# Patient Record
Sex: FEMALE | Race: WHITE | NOT HISPANIC OR LATINO | Employment: FULL TIME | URBAN - NONMETROPOLITAN AREA
[De-identification: names, ages, dates, MRNs, and addresses within clinical notes are randomized per-mention and may not be internally consistent; named-entity substitution may affect disease eponyms.]

---

## 2017-02-14 ENCOUNTER — APPOINTMENT (OUTPATIENT)
Dept: GENERAL RADIOLOGY | Facility: HOSPITAL | Age: 32
End: 2017-02-14

## 2017-02-14 ENCOUNTER — HOSPITAL ENCOUNTER (EMERGENCY)
Facility: HOSPITAL | Age: 32
Discharge: HOME | End: 2017-02-14
Admitting: FAMILY MEDICINE

## 2017-02-14 VITALS
HEART RATE: 95 BPM | SYSTOLIC BLOOD PRESSURE: 144 MMHG | TEMPERATURE: 97.9 F | OXYGEN SATURATION: 100 % | RESPIRATION RATE: 18 BRPM | BODY MASS INDEX: 31.89 KG/M2 | DIASTOLIC BLOOD PRESSURE: 104 MMHG | HEIGHT: 63 IN | WEIGHT: 180 LBS

## 2017-02-14 DIAGNOSIS — S60.212A CONTUSION OF LEFT WRIST, INITIAL ENCOUNTER: Primary | ICD-10-CM

## 2017-02-14 PROCEDURE — 73130 X-RAY EXAM OF HAND: CPT

## 2017-02-14 PROCEDURE — 73110 X-RAY EXAM OF WRIST: CPT

## 2017-02-14 PROCEDURE — 99283 EMERGENCY DEPT VISIT LOW MDM: CPT

## 2017-02-14 RX ORDER — IBUPROFEN 800 MG/1
800 TABLET ORAL
Qty: 20 TABLET | Refills: 0 | Status: SHIPPED | OUTPATIENT
Start: 2017-02-14 | End: 2019-02-26

## 2017-02-15 NOTE — ED NOTES
Patient discharged home  with family at side ambulatory to personal car. No distress noted. Personal belongings with patient. Patient voiced understanding to instructions given. Vascular checks intact.       Nilam Dunbar RN  02/14/17 2025       Nilam Dunbar RN  02/14/17 2035

## 2017-02-15 NOTE — ED PROVIDER NOTES
Subjective   HPI Comments: Patient is a 31-year-old female presents with left wrist and hand pain for the past 2 weeks.  She states she thinks that she hit it on something while she was trying to  her child, she does not know what it was.  She states she has been having pain since that time.    Patient is a 31 y.o. female presenting with wrist injury.   History provided by:  Patient   used: No    Wrist Injury       Review of Systems   Constitutional: Negative.    HENT: Negative.    Eyes: Negative.    Respiratory: Negative.    Cardiovascular: Negative.    Gastrointestinal: Negative.    Endocrine: Negative.    Genitourinary: Negative.    Musculoskeletal:        Patient states that she hit her hand and wrist on something in her home while picking up her child about 2 weeks ago.  She states she is continued have pain and swelling to the area.  She states most of her pain is to the distal radial area of the left wrist and radiates into the hand.   Skin: Negative.    Allergic/Immunologic: Negative.    Neurological: Negative.    Hematological: Negative.    Psychiatric/Behavioral: Negative.    All other systems reviewed and are negative.      Past Medical History   Diagnosis Date   • Asthma      childhood   • Depression    • Hypertension    • Placenta abruptio        Allergies   Allergen Reactions   • Bee Venom        Past Surgical History   Procedure Laterality Date   •  section     • Tonsillectomy     • Cyst removal       underarm       Family History   Problem Relation Age of Onset   • Hypertension Father    • Diabetes Father    • Mental illness Father    • Seizures Father    • Hypertension Mother    • Diabetes Mother    • Mental illness Mother    • No Known Problems Brother    • No Known Problems Brother    • No Known Problems Sister        Social History     Social History   • Marital status: Single     Spouse name: N/A   • Number of children: N/A   • Years of education: N/A  "    Social History Main Topics   • Smoking status: Former Smoker     Quit date: 9/6/2015   • Smokeless tobacco: None   • Alcohol use No   • Drug use: No   • Sexual activity: Yes     Partners: Male     Other Topics Concern   • None     Social History Narrative       Prior to Admission medications    Medication Sig Start Date End Date Taking? Authorizing Provider   docusate sodium (COLACE) 100 MG capsule Take 400 mg by mouth Daily.    Historical Provider, MD   [id number] FACTOR PO Take 400 mg by mouth Daily.    Historical Provider, MD   ibuprofen (ADVIL,MOTRIN) 800 MG tablet Tid prn mild/moderate pain 10/22/16   Apoorva Walker MD   Prenatal Vit-Fe Fumarate-FA (PRENATAL VITAMIN 27-0.8) 27-0.8 MG tablet tablet Take 1 tablet by mouth daily.    Historical Provider, MD       Visit Vitals   • BP (!) 144/104 (BP Location: Right arm, Patient Position: Sitting)   • Pulse 95   • Temp 97.9 °F (36.6 °C) (Temporal Artery )   • Resp 18   • Ht 63\" (160 cm)   • Wt 180 lb (81.6 kg)   • LMP 02/05/2016   • SpO2 100%   • BMI 31.89 kg/m2       Objective   Physical Exam   Constitutional: She is oriented to person, place, and time. Vital signs are normal. She appears well-developed and well-nourished.  Non-toxic appearance. No distress.   HENT:   Head: Normocephalic. Head is without raccoon's eyes, without Aaron's sign, without abrasion, without contusion and without laceration.   Right Ear: Tympanic membrane and external ear normal.   Left Ear: Tympanic membrane and external ear normal.   Nose: Nose normal.   Mouth/Throat: Oropharynx is clear and moist.   Eyes: Conjunctivae, EOM and lids are normal. Pupils are equal, round, and reactive to light.   Neck: Trachea normal, normal range of motion and full passive range of motion without pain. Neck supple. No JVD present. No spinous process tenderness and no muscular tenderness present. Carotid bruit is not present. No tracheal deviation and normal range of motion present. "   Cardiovascular: Normal rate, regular rhythm, normal heart sounds, intact distal pulses and normal pulses.  PMI is not displaced.    Pulmonary/Chest: Effort normal and breath sounds normal. No accessory muscle usage or stridor. No apnea and no tachypnea. No respiratory distress. Chest wall is not dull to percussion. She exhibits no mass, no tenderness, no bony tenderness, no laceration, no crepitus, no deformity and no swelling.   Abdominal: Soft. Normal aorta and bowel sounds are normal. There is no hepatosplenomegaly. There is no tenderness. There is no CVA tenderness.   Musculoskeletal:        Cervical back: Normal. She exhibits normal range of motion, no tenderness, no bony tenderness, no spasm and normal pulse.        Thoracic back: Normal. She exhibits normal range of motion, no tenderness, no bony tenderness, no spasm and normal pulse.        Lumbar back: She exhibits normal range of motion, no tenderness, no bony tenderness, no deformity, no laceration, no pain, no spasm and normal pulse.   Lue: sl edema noted to distal left wrist and dorsal aspect of left hand. periph pulses palp    Neurological: She is alert and oriented to person, place, and time. She has normal strength and normal reflexes. No cranial nerve deficit or sensory deficit. GCS eye subscore is 4. GCS verbal subscore is 5. GCS motor subscore is 6.   Reflex Scores:       Tricep reflexes are 2+ on the right side and 2+ on the left side.       Bicep reflexes are 2+ on the right side and 2+ on the left side.       Patellar reflexes are 2+ on the right side and 2+ on the left side.       Achilles reflexes are 2+ on the right side and 2+ on the left side.  Skin: Skin is warm, dry and intact. No abrasion, no ecchymosis and no laceration noted.   Psychiatric: She has a normal mood and affect. Her speech is normal and behavior is normal.   Nursing note and vitals reviewed.      Procedures         Lab Results (last 24 hours)     ** No results found for  the last 24 hours. **          XR Wrist 3+ View Left   ED Interpretation   No acute abnormality.   This report was finalized on 02/14/2017 19:44 by Dr. Neto Kaiser MD.      Final Result   No acute abnormality.   This report was finalized on 02/14/2017 19:44 by Dr. Neto Kaiser MD.      XR Hand 3+ View Left   ED Interpretation   No acute osseous abnormality. Swelling over the thenar   region is noted.   This report was finalized on 02/14/2017 19:45 by Dr. Neto Kaiser MD.      Final Result   No acute osseous abnormality. Swelling over the thenar   region is noted.   This report was finalized on 02/14/2017 19:45 by Dr. Neto Kaiser MD.          ED Course  ED Course          MDM  Number of Diagnoses or Management Options  Contusion of left wrist, initial encounter: minor     Amount and/or Complexity of Data Reviewed  Tests in the radiology section of CPT®: ordered and reviewed  Independent visualization of images, tracings, or specimens: yes    Risk of Complications, Morbidity, and/or Mortality  Presenting problems: minimal  Diagnostic procedures: minimal  Management options: minimal    Patient Progress  Patient progress: stable      Final diagnoses:   Contusion of left wrist, initial encounter          Radha Katz, APRN  02/14/17 2349

## 2017-02-15 NOTE — ED NOTES
Kemi JOSEPH instructed on pt BP being elevated and that she told me she is usually on BP med, but has a 3 month old at home she has been breast feeding and hasn't been taking, but will when she gets home. Radha JOSEPH stated Ok and when I went to instruct pt she had already left.     Nliam Dunbar RN  02/14/17 2032

## 2017-11-09 ENCOUNTER — HOSPITAL ENCOUNTER (EMERGENCY)
Age: 32
Discharge: HOME | End: 2017-11-09
Payer: COMMERCIAL

## 2017-11-09 ENCOUNTER — HOSPITAL ENCOUNTER (EMERGENCY)
Facility: HOSPITAL | Age: 32
Discharge: LEFT BEFORE DISCHARGE | End: 2017-11-09

## 2017-11-09 VITALS
WEIGHT: 180 LBS | RESPIRATION RATE: 20 BRPM | HEIGHT: 63 IN | BODY MASS INDEX: 31.89 KG/M2 | SYSTOLIC BLOOD PRESSURE: 162 MMHG | OXYGEN SATURATION: 100 % | HEART RATE: 90 BPM | TEMPERATURE: 97.4 F | DIASTOLIC BLOOD PRESSURE: 93 MMHG

## 2017-11-09 VITALS
SYSTOLIC BLOOD PRESSURE: 162 MMHG | RESPIRATION RATE: 28 BRPM | DIASTOLIC BLOOD PRESSURE: 121 MMHG | TEMPERATURE: 98.1 F | WEIGHT: 180 LBS | OXYGEN SATURATION: 100 % | HEIGHT: 63 IN | BODY MASS INDEX: 31.89 KG/M2 | HEART RATE: 103 BPM

## 2017-11-09 DIAGNOSIS — M62.838 SPASM OF MUSCLE: Primary | ICD-10-CM

## 2017-11-09 PROCEDURE — 99283 EMERGENCY DEPT VISIT LOW MDM: CPT | Performed by: NURSE PRACTITIONER

## 2017-11-09 PROCEDURE — 99282 EMERGENCY DEPT VISIT SF MDM: CPT

## 2017-11-09 PROCEDURE — 96372 THER/PROPH/DIAG INJ SC/IM: CPT

## 2017-11-09 PROCEDURE — 6360000002 HC RX W HCPCS: Performed by: NURSE PRACTITIONER

## 2017-11-09 PROCEDURE — 99211 OFF/OP EST MAY X REQ PHY/QHP: CPT

## 2017-11-09 RX ORDER — CYCLOBENZAPRINE HCL 10 MG
10 TABLET ORAL 3 TIMES DAILY PRN
Qty: 12 TABLET | Refills: 0 | Status: SHIPPED | OUTPATIENT
Start: 2017-11-09 | End: 2017-11-13

## 2017-11-09 RX ORDER — KETOROLAC TROMETHAMINE 30 MG/ML
60 INJECTION, SOLUTION INTRAMUSCULAR; INTRAVENOUS ONCE
Status: COMPLETED | OUTPATIENT
Start: 2017-11-09 | End: 2017-11-09

## 2017-11-09 RX ORDER — ORPHENADRINE CITRATE 30 MG/ML
60 INJECTION INTRAMUSCULAR; INTRAVENOUS ONCE
Status: COMPLETED | OUTPATIENT
Start: 2017-11-09 | End: 2017-11-09

## 2017-11-09 RX ADMIN — ORPHENADRINE CITRATE 60 MG: 30 INJECTION INTRAMUSCULAR; INTRAVENOUS at 21:58

## 2017-11-09 RX ADMIN — KETOROLAC TROMETHAMINE 60 MG: 30 INJECTION, SOLUTION INTRAMUSCULAR at 21:58

## 2017-11-09 ASSESSMENT — ENCOUNTER SYMPTOMS
GASTROINTESTINAL NEGATIVE: 1
EYES NEGATIVE: 1
RESPIRATORY NEGATIVE: 1

## 2017-11-09 ASSESSMENT — PAIN SCALES - GENERAL
PAINLEVEL_OUTOF10: 8
PAINLEVEL_OUTOF10: 8

## 2017-11-10 NOTE — ED PROVIDER NOTES
[facility]  eMERGENCY dEPARTMENT eNCOUnter      Pt Name: Conner Thrasher  MRN: [id number]  [place] 1985  Date of evaluation: 2017  Provider: ANNE MARIE Briggs    CHIEF COMPLAINT       Chief Complaint   Patient presents with    Neck Pain     no new injury, woke up like this         HISTORY OF PRESENT ILLNESS  (Location/Symptom, Timing/Onset, Context/Setting, Quality, Duration, Modifying Factors, Severity.)   Conner Thrasher is a 29 y.o. female who presents to the emergency department with complaints of a muscle spasms. Onset this morning. Patient reports she woke up with her neck hurting and is has progressively gotten worse throughout the day. She denies a headache, visual disturbances, any recent head or neck injury, she denies any numbness, tingling, burning sensation to her extremities, loss of bowel or bladder. HPI    Nursing Notes were reviewed and I agree. REVIEW OF SYSTEMS    (2-9 systems for level 4, 10 or more for level 5)     Review of Systems   Constitutional: Negative. HENT: Negative. Eyes: Negative. Respiratory: Negative. Gastrointestinal: Negative. Musculoskeletal: Positive for neck stiffness. Skin: Negative. Neurological: Negative. Psychiatric/Behavioral: Negative. PAST MEDICAL HISTORY   History reviewed. No pertinent past medical history. SURGICAL HISTORY       Past Surgical History:   Procedure Laterality Date     SECTION           CURRENT MEDICATIONS       Discharge Medication List as of 2017  9:47 PM      CONTINUE these medications which have NOT CHANGED    Details   ibuprofen (ADVIL;MOTRIN) 800 MG tablet Take 800 mg by mouth every 6 hours as needed for Pain. ALLERGIES     Review of patient's allergies indicates no known allergies.     FAMILY HISTORY       Family History   Problem Relation Age of Onset    Hypertension Paternal Grandfather     Diabetes Paternal Grandfather     Cancer Paternal Grandmother     Hypertension Paternal Grandmother     Diabetes Paternal Grandmother     Diabetes Maternal Grandmother     Hypertension Maternal Grandmother     Hypertension Maternal Grandfather     Diabetes Maternal Grandfather     Diabetes Father     Eclampsia Father     Diabetes Mother     Eclampsia Mother           SOCIAL HISTORY       Social History     Social History    Marital status: Single     Spouse name: N/A    Number of children: N/A    Years of education: N/A     Social History Main Topics    Smoking status: Former Smoker    Smokeless tobacco: Never Used    Alcohol use No    Drug use: No    Sexual activity: No     Other Topics Concern    None     Social History Narrative    None       SCREENINGS           PHYSICAL EXAM    (up to 7 for level 4, 8 or more for level 5)     ED Triage Vitals   BP Temp Temp Source Pulse Resp SpO2 Height Weight   11/09/17 2024 11/09/17 2023 11/09/17 2023 11/09/17 2023 11/09/17 2023 11/09/17 2023 11/09/17 2023 11/09/17 2023   (!) 162/93 97.4 °F (36.3 °C) Temporal 90 20 100 % 5' 3\" (1.6 m) 180 lb (81.6 kg)       Physical Exam   Constitutional: She is oriented to person, place, and time. She appears well-developed and well-nourished. HENT:   Head: Normocephalic and atraumatic. Right Ear: External ear normal.   Left Ear: External ear normal.   Nose: Nose normal.   Mouth/Throat: Oropharynx is clear and moist.   Eyes: Conjunctivae and EOM are normal. Pupils are equal, round, and reactive to light. Neck: Normal range of motion. Neck supple. Cardiovascular: Normal rate, regular rhythm and normal heart sounds. Pulmonary/Chest: Effort normal and breath sounds normal.   Abdominal: Soft. Bowel sounds are normal.   Musculoskeletal: Normal range of motion. Neurological: She is alert and oriented to person, place, and time. Skin: Skin is warm and dry. Psychiatric: She has a normal mood and affect.      DIAGNOSTIC RESULTS     RADIOLOGY:   Non-plain film images such as CT, Ultrasound and MRI are read by the radiologist. Plain radiographic images are visualized and preliminarily interpreted by No att. providers found with the below findings:      Interpretation per the Radiologist below, if available at the time of this note:    No orders to display       LABS:  Labs Reviewed - No data to display    All other labs were within normal range or not returned as of this dictation. RE-ASSESSMENT        EMERGENCY DEPARTMENT COURSE and DIFFERENTIAL DIAGNOSIS/MDM:   Vitals:    Vitals:    11/09/17 2023 11/09/17 2024   BP:  (!) 162/93   Pulse: 90    Resp: 20    Temp: 97.4 °F (36.3 °C)    TempSrc: Temporal    SpO2: 100%    Weight: 180 lb (81.6 kg)    Height: 5' 3\" (1.6 m)        MDM  Number of Diagnoses or Management Options  Spasm of muscle:   Diagnosis management comments: Diagnosis management comments:   82-year-old female presents to the emergency Department with complaints of neck pain. Patient reports that she woke up this morning with a stiff neck that has progressively gotten worse throughout the day. As I was reviewing her records I noticed that the patient had reported to a local emergency Department prior to arrival here. I spoke with her in regards to that event she relayed to me that she left the emergency department \"because the lady was rude to me\". Patient has full range of motion of her neck. Patient denies new injury. Emergency Room Treatment Plan:  The patient was evaluated. Case discussed with Dr. Kb Carmona. Patient given muscle relaxer and pain medication. Patient will be discharged home. Patient to follow-up with Dr. Anne Bui if her symptoms return. Patient given return ER instructions if she develops severe headache numbness, tingling, burning sensation to her lower extremities. Patient agrees to discharge plan. Differentials include neck pain, muscle spasm, torticollis. The patient's symptoms have improved as she is laughing with her friend.  She

## 2018-07-31 ENCOUNTER — HOSPITAL ENCOUNTER (EMERGENCY)
Age: 33
Discharge: HOME | End: 2018-07-31
Payer: COMMERCIAL

## 2018-07-31 VITALS
RESPIRATION RATE: 16 BRPM | TEMPERATURE: 98.3 F | HEART RATE: 83 BPM | OXYGEN SATURATION: 98 % | WEIGHT: 170 LBS | DIASTOLIC BLOOD PRESSURE: 88 MMHG | SYSTOLIC BLOOD PRESSURE: 137 MMHG | HEIGHT: 63 IN | BODY MASS INDEX: 30.12 KG/M2

## 2018-07-31 DIAGNOSIS — M62.838 NECK MUSCLE SPASM: Primary | ICD-10-CM

## 2018-07-31 PROCEDURE — 99283 EMERGENCY DEPT VISIT LOW MDM: CPT | Performed by: NURSE PRACTITIONER

## 2018-07-31 PROCEDURE — 96372 THER/PROPH/DIAG INJ SC/IM: CPT

## 2018-07-31 PROCEDURE — 6370000000 HC RX 637 (ALT 250 FOR IP): Performed by: NURSE PRACTITIONER

## 2018-07-31 PROCEDURE — 6360000002 HC RX W HCPCS: Performed by: NURSE PRACTITIONER

## 2018-07-31 PROCEDURE — 99282 EMERGENCY DEPT VISIT SF MDM: CPT

## 2018-07-31 RX ORDER — KETOROLAC TROMETHAMINE 30 MG/ML
60 INJECTION, SOLUTION INTRAMUSCULAR; INTRAVENOUS ONCE
Status: COMPLETED | OUTPATIENT
Start: 2018-07-31 | End: 2018-07-31

## 2018-07-31 RX ORDER — ORPHENADRINE CITRATE 30 MG/ML
60 INJECTION INTRAMUSCULAR; INTRAVENOUS ONCE
Status: COMPLETED | OUTPATIENT
Start: 2018-07-31 | End: 2018-07-31

## 2018-07-31 RX ORDER — OXYCODONE HYDROCHLORIDE AND ACETAMINOPHEN 5; 325 MG/1; MG/1
2 TABLET ORAL ONCE
Status: COMPLETED | OUTPATIENT
Start: 2018-07-31 | End: 2018-07-31

## 2018-07-31 RX ORDER — ONDANSETRON 4 MG/1
4 TABLET, ORALLY DISINTEGRATING ORAL ONCE
Status: COMPLETED | OUTPATIENT
Start: 2018-07-31 | End: 2018-07-31

## 2018-07-31 RX ORDER — TIZANIDINE 4 MG/1
4 TABLET ORAL EVERY 6 HOURS PRN
COMMUNITY
End: 2019-10-31

## 2018-07-31 RX ADMIN — ORPHENADRINE CITRATE 60 MG: 30 INJECTION INTRAMUSCULAR; INTRAVENOUS at 20:21

## 2018-07-31 RX ADMIN — OXYCODONE HYDROCHLORIDE AND ACETAMINOPHEN 2 TABLET: 5; 325 TABLET ORAL at 20:21

## 2018-07-31 RX ADMIN — ONDANSETRON 4 MG: 4 TABLET, ORALLY DISINTEGRATING ORAL at 20:21

## 2018-07-31 RX ADMIN — KETOROLAC TROMETHAMINE 60 MG: 30 INJECTION, SOLUTION INTRAMUSCULAR; INTRAVENOUS at 20:21

## 2018-07-31 ASSESSMENT — PAIN SCALES - GENERAL
PAINLEVEL_OUTOF10: 4
PAINLEVEL_OUTOF10: 2
PAINLEVEL_OUTOF10: 8
PAINLEVEL_OUTOF10: 7

## 2018-07-31 ASSESSMENT — PAIN DESCRIPTION - PAIN TYPE: TYPE: ACUTE PAIN

## 2018-07-31 ASSESSMENT — PAIN - FUNCTIONAL ASSESSMENT: PAIN_FUNCTIONAL_ASSESSMENT: 0-10

## 2018-07-31 ASSESSMENT — PAIN DESCRIPTION - LOCATION: LOCATION: NECK

## 2018-07-31 ASSESSMENT — PAIN DESCRIPTION - FREQUENCY: FREQUENCY: CONTINUOUS

## 2018-08-01 NOTE — ED PROVIDER NOTES
Paternal Grandfather     Cancer Paternal Grandmother     Hypertension Paternal Grandmother     Diabetes Paternal Grandmother     Diabetes Maternal Grandmother     Hypertension Maternal Grandmother     Hypertension Maternal Grandfather     Diabetes Maternal Grandfather     Diabetes Father     Eclampsia Father     Diabetes Mother     Eclampsia Mother           SOCIAL HISTORY       Social History     Social History    Marital status: Single     Spouse name: N/A    Number of children: N/A    Years of education: N/A     Social History Main Topics    Smoking status: Former Smoker    Smokeless tobacco: Never Used    Alcohol use No    Drug use: No    Sexual activity: No     Other Topics Concern    None     Social History Narrative    None       SCREENINGS           PHYSICAL EXAM    (up to 7 for level 4, 8 or more for level 5)     ED Triage Vitals [07/31/18 1901]   BP Temp Temp Source Pulse Resp SpO2 Height Weight   (!) 157/98 98.3 °F (36.8 °C) Oral 108 18 97 % 5' 3\" (1.6 m) 170 lb (77.1 kg)       Physical Exam   Constitutional: She is oriented to person, place, and time. She appears well-developed and well-nourished. No distress. HENT:   Head: Normocephalic and atraumatic. Eyes: Conjunctivae and EOM are normal. Pupils are equal, round, and reactive to light. Right eye exhibits no discharge. Left eye exhibits no discharge. Musculoskeletal: Normal range of motion. She exhibits tenderness. She exhibits no edema or deformity. Arms:  Neurological: She is alert and oriented to person, place, and time. Skin: Skin is warm and dry. She is not diaphoretic. Psychiatric: She has a normal mood and affect. Nursing note and vitals reviewed.         DIAGNOSTIC RESULTS     RADIOLOGY:   Non-plain film images such as CT, Ultrasound and MRI are read by the radiologist. Plain radiographic images are visualized and preliminarily interpreted by No att. providers found with the below findings:        Interpretation per the Radiologist below, if available at the time of this note:    No orders to display       LABS:  Labs Reviewed - No data to display    All other labs were within normal range or not returned as of this dictation. RE-ASSESSMENT          EMERGENCY DEPARTMENT COURSE and DIFFERENTIAL DIAGNOSIS/MDM:   Vitals:    Vitals:    07/31/18 1901 07/31/18 2105   BP: (!) 157/98 137/88   Pulse: 108 83   Resp: 18 16   Temp: 98.3 °F (36.8 °C) 98.3 °F (36.8 °C)   TempSrc: Oral    SpO2: 97% 98%   Weight: 170 lb (77.1 kg)    Height: 5' 3\" (1.6 m)            MDM  Number of Diagnoses or Management Options  Diagnosis management comments: Briefly the patient presents to the ED with muscle spasms mostly localized to the right trapezium. No injury or trauma and the patient frequently gets spasms therefore do not feel the need to proceed with any diagnostic imaging today. We will treat her spasms with anti-inflammatories, muscle relaxants and pain medicine. She has tizanidine and a TENS unit at home she is followed with Dr. Trudy Peter. I have given her return back precautions to the ED if she has any new or worsening symptoms. PROCEDURES:    Procedures      FINAL IMPRESSION      1.  Neck muscle spasm          DISPOSITION/PLAN   DISPOSITION        PATIENT REFERRED TO:  Eleni Delgadillo MD  [address] [id number] [phone]      As needed, If symptoms worsen    [facility] EMERGENCY DEPT  [facility]  [phone]    As needed, If symptoms worsen      DISCHARGE MEDICATIONS:  Discharge Medication List as of 7/31/2018  8:34 PM          (Please note that portions of this note were completed with a voice recognition program.  Efforts were made to edit the dictations but occasionally words are mis-transcribed.)    ANNE MARIE Sawant APRN  08/01/18 0000

## 2019-05-25 ENCOUNTER — TRANSCRIBE ORDERS (OUTPATIENT)
Dept: ADMINISTRATIVE | Facility: HOSPITAL | Age: 34
End: 2019-05-25

## 2019-05-25 ENCOUNTER — HOSPITAL ENCOUNTER (OUTPATIENT)
Dept: GENERAL RADIOLOGY | Facility: HOSPITAL | Age: 34
Discharge: HOME | End: 2019-05-25
Admitting: PEDIATRICS

## 2019-05-25 DIAGNOSIS — S16.1XXA STRAIN OF MUSCLE, FASCIA AND TENDON AT NECK LEVEL, INITIAL ENCOUNTER: Primary | ICD-10-CM

## 2019-05-25 DIAGNOSIS — S16.1XXA STRAIN OF MUSCLE, FASCIA AND TENDON AT NECK LEVEL, INITIAL ENCOUNTER: ICD-10-CM

## 2019-05-25 PROCEDURE — 72050 X-RAY EXAM NECK SPINE 4/5VWS: CPT

## 2019-06-16 ENCOUNTER — HOSPITAL ENCOUNTER (EMERGENCY)
Facility: HOSPITAL | Age: 34
Discharge: HOME | End: 2019-06-16
Attending: EMERGENCY MEDICINE | Admitting: EMERGENCY MEDICINE

## 2019-06-16 VITALS
OXYGEN SATURATION: 100 % | SYSTOLIC BLOOD PRESSURE: 135 MMHG | DIASTOLIC BLOOD PRESSURE: 96 MMHG | WEIGHT: 145 LBS | HEIGHT: 63 IN | BODY MASS INDEX: 25.69 KG/M2 | TEMPERATURE: 98.1 F | HEART RATE: 91 BPM | RESPIRATION RATE: 20 BRPM

## 2019-06-16 DIAGNOSIS — M54.2 NECK PAIN: ICD-10-CM

## 2019-06-16 DIAGNOSIS — G89.29 CHRONIC BILATERAL BACK PAIN, UNSPECIFIED BACK LOCATION: ICD-10-CM

## 2019-06-16 DIAGNOSIS — M54.9 CHRONIC BILATERAL BACK PAIN, UNSPECIFIED BACK LOCATION: ICD-10-CM

## 2019-06-16 DIAGNOSIS — M62.838 MUSCLE SPASM: Primary | ICD-10-CM

## 2019-06-16 PROCEDURE — 25010000002 ORPHENADRINE CITRATE PER 60 MG: Performed by: EMERGENCY MEDICINE

## 2019-06-16 PROCEDURE — 99283 EMERGENCY DEPT VISIT LOW MDM: CPT

## 2019-06-16 PROCEDURE — 96372 THER/PROPH/DIAG INJ SC/IM: CPT

## 2019-06-16 RX ORDER — ORPHENADRINE CITRATE 30 MG/ML
30 INJECTION INTRAMUSCULAR; INTRAVENOUS ONCE
Status: COMPLETED | OUTPATIENT
Start: 2019-06-16 | End: 2019-06-16

## 2019-06-16 RX ADMIN — ORPHENADRINE CITRATE 30 MG: 30 INJECTION INTRAMUSCULAR; INTRAVENOUS at 18:25

## 2019-07-03 ENCOUNTER — TRANSCRIBE ORDERS (OUTPATIENT)
Dept: PHYSICAL THERAPY | Facility: HOSPITAL | Age: 34
End: 2019-07-03

## 2019-07-03 ENCOUNTER — HOSPITAL ENCOUNTER (OUTPATIENT)
Dept: PHYSICAL THERAPY | Facility: HOSPITAL | Age: 34
Setting detail: THERAPIES SERIES
Discharge: HOME | End: 2019-07-03

## 2019-07-03 DIAGNOSIS — M54.2 CERVICALGIA: Primary | ICD-10-CM

## 2019-07-03 DIAGNOSIS — M62.838 SPASM OF MUSCLE: Primary | ICD-10-CM

## 2019-07-03 PROCEDURE — 97110 THERAPEUTIC EXERCISES: CPT | Performed by: PHYSICAL THERAPIST

## 2019-07-03 PROCEDURE — 97162 PT EVAL MOD COMPLEX 30 MIN: CPT | Performed by: PHYSICAL THERAPIST
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
## 2019-07-12 ENCOUNTER — APPOINTMENT (OUTPATIENT)
Dept: PHYSICAL THERAPY | Facility: HOSPITAL | Age: 34
End: 2019-07-12

## 2019-07-16 ENCOUNTER — HOSPITAL ENCOUNTER (OUTPATIENT)
Dept: PHYSICAL THERAPY | Facility: HOSPITAL | Age: 34
Setting detail: THERAPIES SERIES
Discharge: HOME | End: 2019-07-16

## 2019-07-16 DIAGNOSIS — M54.2 CERVICALGIA: Primary | ICD-10-CM

## 2019-07-16 PROCEDURE — 97140 MANUAL THERAPY 1/> REGIONS: CPT

## 2019-07-16 NOTE — THERAPY TREATMENT NOTE
Outpatient Physical Therapy Ortho Treatment Note  [facility]     Patient Name: Selin Meyer  : 1985  MRN: [id number]  Today's Date: 2019      Visit Date: 2019    Visit Dx:    ICD-10-CM ICD-9-CM   1. Cervicalgia M54.2 723.1       Patient Active Problem List   Diagnosis   • PTSD (post-traumatic stress disorder)   • Preeclampsia   • History of         Past Medical History:   Diagnosis Date   • Asthma     childhood   • Depression    • Hypertension    • Placenta abruptio         Past Surgical History:   Procedure Laterality Date   •  SECTION     • CYST REMOVAL      underarm   • TONSILLECTOMY                         PT Assessment/Plan     Row Name 19 4247          PT Assessment    Assessment Comments  Today was Mrs. Meyer's first visit since her initial evaluation, therefore no goals have been met. We focused primarily on reducing soft tissue restrictions. She was not tender to palpation at all. Pt. would constantly request for heavier pressure during IASTM w/ blue ridged roller and also in STM w/ massage cream to lumbar and cervical areas. Pt. pops her own back and neck to relieve pain. Her pain was reduced post-treatment.   -AF        PT Plan    PT Plan Comments  Continue to decrease soft tissue restrictions in her lumbar and cervical musculature. Progress w/ gentle scapular strengthening and cervical ROM.   -AF       User Key  (r) = Recorded By, (t) = Taken By, (c) = Cosigned By    Initials Name Provider Type    AF Toma Le PTA Physical Therapy Assistant            Exercises     Row Name 19 9637             Subjective Comments    Subjective Comments  Pt. reports not having spasms as usual since her eval. She has been consistent w/ her HEP and states it helps a lot. Sometimes she holds her stretches much longer than 30 seconds to relieve pain. Her back has been hurting her more lately because she's been redecorating her house.   -AF         Subjective  Pain    Able to rate subjective pain?  yes  -AF      Pre-Treatment Pain Level  5  -AF      Post-Treatment Pain Level  2  -AF      Subjective Pain Comment  low back  -AF         Total Minutes    53047 - PT Manual Therapy Minutes  38  -AF        User Key  (r) = Recorded By, (t) = Taken By, (c) = Cosigned By    Initials Name Provider Type    AF [place], Toma, PTA Physical Therapy Assistant                      Manual Rx (last 36 hours)      Manual Treatments     Row Name 07/16/19 1259             Total Minutes    76138 - PT Manual Therapy Minutes  38  -AF         Manual Rx 1    Manual Rx 1 Location  thoracolumbar  -AF      Manual Rx 1 Type  IASTM w/ blue ridged roller  -AF      Manual Rx 1 Duration  8  -AF         Manual Rx 2    Manual Rx 2 Location  lumbar paraspinals  -AF      Manual Rx 2 Type  STM w/ massage cream, prone  -AF      Manual Rx 2 Duration  10  -AF         Manual Rx 3    Manual Rx 3 Location  B UT, LS, & suboccipitals  -AF      Manual Rx 3 Type  STM w/ massage cream, seated in chair  -AF      Manual Rx 3 Duration  10  -AF         Manual Rx 4    Manual Rx 4 Location  cervical  -AF      Manual Rx 4 Type  manual distraction  -AF      Manual Rx 4 Duration  10  -AF        User Key  (r) = Recorded By, (t) = Taken By, (c) = Cosigned By    Initials Name Provider Type    AF [place], Toma, PTA Physical Therapy Assistant          PT OP Goals     Row Name 07/16/19 1259          PT Short Term Goals    STG Date to Achieve  07/24/19  -AF     STG 1  Patient will begin a home exercie program to accelerate the recovery process  -AF     STG 1 Progress  Ongoing  -AF     STG 2  Patient will have a 50% reduction in frequency and intensity of neck pain and spasms  -AF     STG 2 Progress  Ongoing  -AF     STG 2 Progress Comments  Pt. reports not having any spasms, since before her evaluation.   -AF        Long Term Goals    LTG Date to Achieve  08/14/19  -AF     LTG 1  Patient will gain an understanding of proper body  mechanics, lifting techniques, posture principles and spine care  -AF     LTG 1 Progress  Ongoing  -AF     LTG 2  Patient will regain normal cervical ROM without muscle tension  -AF     LTG 2 Progress  Ongoing  -AF     LTG 3  Patient will have an increase in cervical, scapular and core stability  -AF     LTG 3 Progress  Ongoing  -AF     LTG 4  Patient will have an 85 - 100% reduction in fibrosis and palpable tenderness of cervical and scapular musculature  -AF     LTG 4 Progress  Ongoing  -AF     LTG 4 Progress Comments  Addressed w/ STM. Pt. is able to tolerate heavy amounts of OP.   -AF     LTG 5  Patient will be instructed in a long term exercise program to continue to make gains following discharge from therapy  -AF     LTG 5 Progress  Ongoing  -AF        Time Calculation    PT Goal Re-Cert Due Date  08/02/19  -AF       User Key  (r) = Recorded By, (t) = Taken By, (c) = Cosigned By    Initials Name Provider Type    AF Toma Le, LYNN Physical Therapy Assistant          Therapy Education  Education Details: duration of stretches  Given: HEP, Symptoms/condition management  Program: Reinforced  How Provided: Verbal  Provided to: Patient  Level of Understanding: Verbalized              Time Calculation:   Start Time: 1259  Stop Time: 1344  Time Calculation (min): 45 min  Total Timed Code Minutes- PT: 38 minute(s)  Therapy Charges for Today     Code Description Service Date Service Provider Modifiers Qty    12192037299 HC PT MANUAL THERAPY EA 15 MIN 7/16/2019 Toma Le PTA GP 3                    Toma Le PTA  7/16/2019

## 2019-07-18 ENCOUNTER — APPOINTMENT (OUTPATIENT)
Dept: PHYSICAL THERAPY | Facility: HOSPITAL | Age: 34
End: 2019-07-18

## 2019-07-23 ENCOUNTER — HOSPITAL ENCOUNTER (OUTPATIENT)
Dept: PHYSICAL THERAPY | Facility: HOSPITAL | Age: 34
Setting detail: THERAPIES SERIES
Discharge: HOME | End: 2019-07-23

## 2019-07-23 DIAGNOSIS — M54.2 CERVICALGIA: Primary | ICD-10-CM

## 2019-07-23 PROCEDURE — 97110 THERAPEUTIC EXERCISES: CPT

## 2019-07-23 PROCEDURE — 97140 MANUAL THERAPY 1/> REGIONS: CPT

## 2019-07-23 NOTE — THERAPY TREATMENT NOTE
Outpatient Physical Therapy Ortho Treatment Note   [facility]     Patient Name: Selin Meyer  : 1985  MRN: [id number]  Today's Date: 2019      Visit Date: 2019    Visit Dx:    ICD-10-CM ICD-9-CM   1. Cervicalgia M54.2 723.1       Patient Active Problem List   Diagnosis   • PTSD (post-traumatic stress disorder)   • Preeclampsia   • History of         Past Medical History:   Diagnosis Date   • Asthma     childhood   • Depression    • Hypertension    • Placenta abruptio         Past Surgical History:   Procedure Laterality Date   •  SECTION     • CYST REMOVAL      underarm   • TONSILLECTOMY                         PT Assessment/Plan     Row Name 19 1939          PT Assessment    Assessment Comments  Today we focused on increasing mobility in her neck and thoracic spine as well as light scapular strengthening. Pt. reported a spasm in her L scapula while performing scapular retraction in a seated position. Her HEP was bolstered w/ scapular strengthening exercises. There will be a gap in her visits due to her being out of town.   -AF        PT Plan    PT Plan Comments  Assess goals for progress note and fill out auth forms. Assess for any increase in symptoms post-vacation and progress scapular and core strengthening. Add UT & LS stretches to HEP.   (Significant)   -AF       User Key  (r) = Recorded By, (t) = Taken By, (c) = Cosigned By    Initials Name Provider Type    AF Toma Le, PTA Physical Therapy Assistant            Exercises     Row Name 19 7863             Subjective Comments    Subjective Comments  Pt. states she has not had any spasms since last visit. She does feel tight today though.   -AF         Subjective Pain    Able to rate subjective pain?  yes  -AF      Pre-Treatment Pain Level  2  -AF      Post-Treatment Pain Level  4  -AF      Subjective Pain Comment  neck   -AF         Total Minutes    69900 - PT Therapeutic Exercise Minutes  32  -AF       61376 - PT Manual Therapy Minutes  12  -AF         Exercise 1    Exercise Name 1  towel roll pec stretch  -AF      Cueing 1  Verbal;Tactile  -AF      Time 1  4 min  -AF         Exercise 2    Exercise Name 2  horizontall abd  -AF      Cueing 2  Verbal;Demo  -AF      Sets 2  2  -AF      Reps 2  10  -AF      Additional Comments  Red TB; added to HEP  -AF         Exercise 3    Exercise Name 3  B UE diagonals  -AF      Cueing 3  Verbal;Demo  -AF      Sets 3  2  -AF      Reps 3  10  -AF      Additional Comments  Red TB; added to HEP  -AF         Exercise 4    Exercise Name 4  scap squeezes  -AF      Cueing 4  Verbal;Tactile;Demo  -AF      Sets 4  2  -AF      Reps 4  10  -AF      Additional Comments  added to HEP  -AF         Exercise 5    Exercise Name 5  chin tucks  -AF      Cueing 5  Verbal;Demo  -AF      Sets 5  2  -AF      Reps 5  10  -AF      Additional Comments  added to HEP  -AF        User Key  (r) = Recorded By, (t) = Taken By, (c) = Cosigned By    Initials Name Provider Type    AF [place], Toma, PTA Physical Therapy Assistant                      Manual Rx (last 36 hours)      Manual Treatments     Row Name 07/23/19 1304             Total Minutes    22009 - PT Manual Therapy Minutes  12  -AF         Manual Rx 1    Manual Rx 1 Location  thoracic  -AF      Manual Rx 1 Type  extension mobilizations  -AF      Manual Rx 1 Grade  mod  -AF      Manual Rx 1 Duration  6  -AF         Manual Rx 2    Manual Rx 2 Location  cervical   -AF      Manual Rx 2 Type  manual distraction  -AF      Manual Rx 2 Grade  min-mod  -AF      Manual Rx 2 Duration  6  -AF        User Key  (r) = Recorded By, (t) = Taken By, (c) = Cosigned By    Initials Name Provider Type    AF [place], Toma, PTA Physical Therapy Assistant          PT OP Goals     Row Name 07/23/19 1304          PT Short Term Goals    STG Date to Achieve  07/24/19  -AF     STG 1  Patient will begin a home exercie program to accelerate the recovery process  -AF      STG 1 Progress  Ongoing  -AF     STG 1 Progress Comments  HEP was bolstered today to include scapular strengthening exercises.   -AF     STG 2  Patient will have a 50% reduction in frequency and intensity of neck pain and spasms  -AF     STG 2 Progress  Ongoing  -AF        Long Term Goals    LTG Date to Achieve  08/14/19  -AF     LTG 1  Patient will gain an understanding of proper body mechanics, lifting techniques, posture principles and spine care  -AF     LTG 1 Progress  Ongoing  -AF     LTG 2  Patient will regain normal cervical ROM without muscle tension  -AF     LTG 2 Progress  Ongoing  -AF     LTG 3  Patient will have an increase in cervical, scapular and core stability  -AF     LTG 3 Progress  Ongoing  -AF     LTG 4  Patient will have an 85 - 100% reduction in fibrosis and palpable tenderness of cervical and scapular musculature  -AF     LTG 4 Progress  Ongoing  -AF     LTG 5  Patient will be instructed in a long term exercise program to continue to make gains following discharge from therapy  -AF     LTG 5 Progress  Ongoing  -AF        Time Calculation    PT Goal Re-Cert Due Date  08/02/19  -AF       User Key  (r) = Recorded By, (t) = Taken By, (c) = Cosigned By    Initials Name Provider Type    AF Toma Le, PTA Physical Therapy Assistant          Therapy Education  Education Details: Added towel roll pec stretch, horizontal abd, scap squeezes, and chin tucks to HEP.   Given: HEP, Symptoms/condition management, Posture/body mechanics  Program: New  How Provided: Verbal, Demonstration  Provided to: Patient  Level of Understanding: Verbalized, Demonstrated              Time Calculation:   Start Time: 1304  Stop Time: 1348  Time Calculation (min): 44 min  Total Timed Code Minutes- PT: 44 minute(s)  Therapy Charges for Today     Code Description Service Date Service Provider Modifiers Qty    21908244411 HC PT THER PROC EA 15 MIN 7/23/2019 Toma Le, PTA GP 2    53091642985 HC PT MANUAL THERAPY  EA 15 MIN 7/23/2019 Toma Le, PTA GP 1                    Toma Le PTA  7/23/2019

## 2019-07-24 ENCOUNTER — APPOINTMENT (OUTPATIENT)
Dept: PHYSICAL THERAPY | Facility: HOSPITAL | Age: 34
End: 2019-07-24

## 2019-07-25 ENCOUNTER — APPOINTMENT (OUTPATIENT)
Dept: PHYSICAL THERAPY | Facility: HOSPITAL | Age: 34
End: 2019-07-25

## 2019-07-30 ENCOUNTER — APPOINTMENT (OUTPATIENT)
Dept: PHYSICAL THERAPY | Facility: HOSPITAL | Age: 34
End: 2019-07-30

## 2019-07-31 ENCOUNTER — HOSPITAL ENCOUNTER (OUTPATIENT)
Dept: PHYSICAL THERAPY | Facility: HOSPITAL | Age: 34
Setting detail: THERAPIES SERIES
Discharge: HOME | End: 2019-07-31

## 2019-07-31 DIAGNOSIS — M54.2 CERVICALGIA: Primary | ICD-10-CM

## 2019-07-31 PROCEDURE — 97140 MANUAL THERAPY 1/> REGIONS: CPT

## 2019-07-31 PROCEDURE — 97110 THERAPEUTIC EXERCISES: CPT

## 2019-08-01 ENCOUNTER — HOSPITAL ENCOUNTER (OUTPATIENT)
Dept: PHYSICAL THERAPY | Facility: HOSPITAL | Age: 34
Setting detail: THERAPIES SERIES
Discharge: HOME | End: 2019-08-01

## 2019-08-01 DIAGNOSIS — M54.2 CERVICALGIA: Primary | ICD-10-CM

## 2019-08-01 PROCEDURE — 97110 THERAPEUTIC EXERCISES: CPT

## 2019-08-01 PROCEDURE — 97140 MANUAL THERAPY 1/> REGIONS: CPT

## 2019-08-01 NOTE — THERAPY PROGRESS REPORT/RE-CERT
Outpatient Physical Therapy Ortho Progress Note  [facility]     Patient Name: Selin Meyer  : 1985  MRN: [id number]  Today's Date: 2019      Visit Date: 2019    Visit Dx:    ICD-10-CM ICD-9-CM   1. Cervicalgia M54.2 723.1       Patient Active Problem List   Diagnosis   • PTSD (post-traumatic stress disorder)   • Preeclampsia   • History of         Past Medical History:   Diagnosis Date   • Asthma     childhood   • Depression    • Hypertension    • Placenta abruptio         Past Surgical History:   Procedure Laterality Date   •  SECTION     • CYST REMOVAL      underarm   • TONSILLECTOMY                         PT Assessment/Plan     Row Name 19 1636          PT Assessment    Assessment Comments  Her B cervical rotation is improved and almost symmetrical but her flexion and extension is still very restricted. She has yet to achieve any of her goals thus far but today was just the fourth treatent sessiion following the evaluation.  -EC        PT Plan    PT Plan Comments  Await an authorization for continued visits from her insurance carrier and if approved continue to progress her scapular stabilization.  -EC       User Key  (r) = Recorded By, (t) = Taken By, (c) = Cosigned By    Initials Name Provider Type    Paul Krause PTA Physical Therapy Assistant            Exercises     Row Name 19 1500             Subjective Comments    Subjective Comments  She reports being pain free right now, the last time she had pain was yesterday.   -EC         Subjective Pain    Able to rate subjective pain?  yes  -EC      Pre-Treatment Pain Level  0  -EC      Post-Treatment Pain Level  0  -EC         Total Minutes    11228 - PT Therapeutic Exercise Minutes  25  -EC      03221 - PT Manual Therapy Minutes  15  -EC         Exercise 1    Exercise Name 1  Completed assessments for progress note and continued authorization form (see goals section and hard chart)  -EC          Exercise 2    Exercise Name 2  B unilateral serratus punches with #2  -EC      Cueing 2  Verbal;Tactile  -EC      Reps 2  20  -EC        User Key  (r) = Recorded By, (t) = Taken By, (c) = Cosigned By    Initials Name Provider Type    Paul Krause, PTA Physical Therapy Assistant                      Manual Rx (last 36 hours)      Manual Treatments     Row Name 08/01/19 1500 07/31/19 1303          Total Minutes    87695 - PT Manual Therapy Minutes  15  -EC  15  -AF        Manual Rx 1    Manual Rx 1 Location  B upper and middle traps, & suboccipitals  -EC  B UT, SL, & suboccipitals  -AF     Manual Rx 1 Type  STM w/ massage cream in prone  -EC  STM w/ massage cream in prone  -AF     Manual Rx 1 Grade  mod  -EC  mod  -AF     Manual Rx 1 Duration  15  -EC  15  -AF       User Key  (r) = Recorded By, (t) = Taken By, (c) = Cosigned By    Initials Name Provider Type    Paul Krause, LYNN Physical Therapy Assistant    AF Toma Le, LYNN Physical Therapy Assistant          PT OP Goals     Row Name 08/01/19 1433          PT Short Term Goals    STG Date to Achieve  08/15/19  -EC     STG 1  Patient will begin a home exercie program to accelerate the recovery process  -EC     STG 1 Progress  Ongoing;Progressing  -EC     STG 1 Progress Comments  pt verbalized components  -EC     STG 2  Patient will have a 50% reduction in frequency and intensity of neck pain and spasms  -EC     STG 2 Progress  Ongoing;Progressing  -EC     STG 2 Progress Comments  she reports a 30% symptom reduction, spasms are still intense but her recovery time   -EC        Long Term Goals    LTG Date to Achieve  08/30/19  -EC     LTG 1  Patient will gain an understanding of proper body mechanics, lifting techniques, posture principles and spine care  -EC     LTG 1 Progress  Ongoing;Progressing  -EC     LTG 1 Progress Comments  flexirule analysis today in sitting reveals a slight kyphosis in the lower thoracic region and lordosis in the lumbar  region  -EC     LTG 2  Patient will regain normal cervical ROM without muscle tension  -EC     LTG 2 Progress  Ongoing  -EC     LTG 2 Progress Comments  39 degrees AROM cervical extension, 45 degress flexion, L rotation 60 degrees R 58 degrees  -EC     LTG 3  Patient will have an increase in cervical, scapular and core stability  -EC     LTG 3 Progress  Ongoing  -EC     LTG 3 Progress Comments  L and R lower trap 3+/5, B middle traps 4+/5   -EC     LTG 4  Patient will have an 85 - 100% reduction in fibrosis and palpable tenderness of cervical and scapular musculature  -EC     LTG 4 Progress  Ongoing  -EC     LTG 4 Progress Comments  R middle trap, B upper trap restricted with some guarding present  -EC     LTG 5  Patient will be instructed in a long term exercise program to continue to make gains following discharge from therapy  -EC     LTG 5 Progress  Ongoing  -EC     LTG 5 Progress Comments  see STG # 1  -EC        Time Calculation    PT Goal Re-Cert Due Date  08/31/19  -EC       User Key  (r) = Recorded By, (t) = Taken By, (c) = Cosigned By    Initials Name Provider Type    EC Paul Tapia PTA Physical Therapy Assistant          Therapy Education  Given: HEP, Symptoms/condition management  Program: Reinforced  How Provided: Verbal  Provided to: Patient  Level of Understanding: Verbalized              Time Calculation:   Start Time: 1505  Stop Time: 1545  Time Calculation (min): 40 min  Total Timed Code Minutes- PT: 40 minute(s)  Therapy Charges for Today     Code Description Service Date Service Provider Modifiers Qty    14854484073 HC PT THER PROC EA 15 MIN 8/1/2019 Paul Tapia PTA GP 2    36891264837 HC PT MANUAL THERAPY EA 15 MIN 8/1/2019 Paul Tapia PTA GP 1                    Paul Tapia PTA  8/1/2019

## 2019-10-29 ENCOUNTER — HOSPITAL ENCOUNTER (EMERGENCY)
Facility: HOSPITAL | Age: 34
Discharge: HOME | End: 2019-10-29
Attending: EMERGENCY MEDICINE | Admitting: EMERGENCY MEDICINE

## 2019-10-29 VITALS
SYSTOLIC BLOOD PRESSURE: 148 MMHG | RESPIRATION RATE: 20 BRPM | HEIGHT: 63 IN | OXYGEN SATURATION: 100 % | BODY MASS INDEX: 27.11 KG/M2 | HEART RATE: 93 BPM | WEIGHT: 153 LBS | TEMPERATURE: 97.9 F | DIASTOLIC BLOOD PRESSURE: 95 MMHG

## 2019-10-29 DIAGNOSIS — K52.9 GASTROENTERITIS: Primary | ICD-10-CM

## 2019-10-29 LAB
ALBUMIN SERPL-MCNC: 4.6 G/DL (ref 3.5–5.2)
ALBUMIN/GLOB SERPL: 1.6 G/DL
ALP SERPL-CCNC: 83 U/L (ref 39–117)
ALT SERPL W P-5'-P-CCNC: 10 U/L (ref 1–33)
AMYLASE SERPL-CCNC: 42 U/L (ref 28–100)
ANION GAP SERPL CALCULATED.3IONS-SCNC: 16 MMOL/L (ref 5–15)
AST SERPL-CCNC: 19 U/L (ref 1–32)
BASOPHILS # BLD AUTO: 0.05 10*3/MM3 (ref 0–0.2)
BASOPHILS NFR BLD AUTO: 0.6 % (ref 0–1.5)
BILIRUB SERPL-MCNC: 0.4 MG/DL (ref 0.2–1.2)
BUN BLD-MCNC: 9 MG/DL (ref 6–20)
BUN/CREAT SERPL: 14.8 (ref 7–25)
CALCIUM SPEC-SCNC: 9.1 MG/DL (ref 8.6–10.5)
CHLORIDE SERPL-SCNC: 101 MMOL/L (ref 98–107)
CO2 SERPL-SCNC: 24 MMOL/L (ref 22–29)
CREAT BLD-MCNC: 0.61 MG/DL (ref 0.57–1)
DEPRECATED RDW RBC AUTO: 41.2 FL (ref 37–54)
EOSINOPHIL # BLD AUTO: 0.02 10*3/MM3 (ref 0–0.4)
EOSINOPHIL NFR BLD AUTO: 0.2 % (ref 0.3–6.2)
ERYTHROCYTE [DISTWIDTH] IN BLOOD BY AUTOMATED COUNT: 14 % (ref 12.3–15.4)
GFR SERPL CREATININE-BSD FRML MDRD: 138 ML/MIN/1.73
GLOBULIN UR ELPH-MCNC: 2.8 GM/DL
GLUCOSE BLD-MCNC: 161 MG/DL (ref 65–99)
HCG SERPL QL: NEGATIVE
HCT VFR BLD AUTO: 39.8 % (ref 34–46.6)
HGB BLD-MCNC: 13.6 G/DL (ref 12–15.9)
IMM GRANULOCYTES # BLD AUTO: 0.02 10*3/MM3 (ref 0–0.05)
IMM GRANULOCYTES NFR BLD AUTO: 0.2 % (ref 0–0.5)
LIPASE SERPL-CCNC: 42 U/L (ref 13–60)
LYMPHOCYTES # BLD AUTO: 1.46 10*3/MM3 (ref 0.7–3.1)
LYMPHOCYTES NFR BLD AUTO: 18.1 % (ref 19.6–45.3)
MCH RBC QN AUTO: 27.7 PG (ref 26.6–33)
MCHC RBC AUTO-ENTMCNC: 34.2 G/DL (ref 31.5–35.7)
MCV RBC AUTO: 81.1 FL (ref 79–97)
MONOCYTES # BLD AUTO: 0.31 10*3/MM3 (ref 0.1–0.9)
MONOCYTES NFR BLD AUTO: 3.8 % (ref 5–12)
NEUTROPHILS # BLD AUTO: 6.21 10*3/MM3 (ref 1.7–7)
NEUTROPHILS NFR BLD AUTO: 77.1 % (ref 42.7–76)
NRBC BLD AUTO-RTO: 0 /100 WBC (ref 0–0.2)
PLATELET # BLD AUTO: 318 10*3/MM3 (ref 140–450)
PMV BLD AUTO: 10.5 FL (ref 6–12)
POTASSIUM BLD-SCNC: 3.6 MMOL/L (ref 3.5–5.2)
PROT SERPL-MCNC: 7.4 G/DL (ref 6–8.5)
RBC # BLD AUTO: 4.91 10*6/MM3 (ref 3.77–5.28)
SODIUM BLD-SCNC: 141 MMOL/L (ref 136–145)
WBC NRBC COR # BLD: 8.07 10*3/MM3 (ref 3.4–10.8)

## 2019-10-29 PROCEDURE — 99283 EMERGENCY DEPT VISIT LOW MDM: CPT

## 2019-10-29 PROCEDURE — 83690 ASSAY OF LIPASE: CPT | Performed by: EMERGENCY MEDICINE

## 2019-10-29 PROCEDURE — 84703 CHORIONIC GONADOTROPIN ASSAY: CPT | Performed by: EMERGENCY MEDICINE

## 2019-10-29 PROCEDURE — 25010000002 ONDANSETRON PER 1 MG: Performed by: EMERGENCY MEDICINE

## 2019-10-29 PROCEDURE — 80053 COMPREHEN METABOLIC PANEL: CPT | Performed by: EMERGENCY MEDICINE

## 2019-10-29 PROCEDURE — 25010000002 LORAZEPAM PER 2 MG: Performed by: EMERGENCY MEDICINE

## 2019-10-29 PROCEDURE — 96375 TX/PRO/DX INJ NEW DRUG ADDON: CPT

## 2019-10-29 PROCEDURE — 85025 COMPLETE CBC W/AUTO DIFF WBC: CPT | Performed by: EMERGENCY MEDICINE

## 2019-10-29 PROCEDURE — 25010000002 PROMETHAZINE PER 50 MG: Performed by: EMERGENCY MEDICINE

## 2019-10-29 PROCEDURE — 82150 ASSAY OF AMYLASE: CPT | Performed by: EMERGENCY MEDICINE

## 2019-10-29 PROCEDURE — 96374 THER/PROPH/DIAG INJ IV PUSH: CPT

## 2019-10-29 RX ORDER — SODIUM CHLORIDE 0.9 % (FLUSH) 0.9 %
10 SYRINGE (ML) INJECTION AS NEEDED
Status: DISCONTINUED | OUTPATIENT
Start: 2019-10-29 | End: 2019-10-29 | Stop reason: HOSPADM

## 2019-10-29 RX ORDER — ONDANSETRON 2 MG/ML
4 INJECTION INTRAMUSCULAR; INTRAVENOUS ONCE
Status: COMPLETED | OUTPATIENT
Start: 2019-10-29 | End: 2019-10-29

## 2019-10-29 RX ORDER — LORAZEPAM 2 MG/ML
1 INJECTION INTRAMUSCULAR ONCE
Status: COMPLETED | OUTPATIENT
Start: 2019-10-29 | End: 2019-10-29

## 2019-10-29 RX ORDER — ONDANSETRON 4 MG/1
4 TABLET, ORALLY DISINTEGRATING ORAL EVERY 4 HOURS PRN
Qty: 10 TABLET | Refills: 0 | Status: SHIPPED | OUTPATIENT
Start: 2019-10-29 | End: 2021-07-29

## 2019-10-29 RX ORDER — PROMETHAZINE HYDROCHLORIDE 25 MG/ML
12.5 INJECTION, SOLUTION INTRAMUSCULAR; INTRAVENOUS ONCE
Status: COMPLETED | OUTPATIENT
Start: 2019-10-29 | End: 2019-10-29

## 2019-10-29 RX ADMIN — PROMETHAZINE HYDROCHLORIDE 12.5 MG: 25 INJECTION INTRAMUSCULAR; INTRAVENOUS at 09:27

## 2019-10-29 RX ADMIN — SODIUM CHLORIDE 1000 ML: 9 INJECTION, SOLUTION INTRAVENOUS at 09:20

## 2019-10-29 RX ADMIN — LORAZEPAM 1 MG: 2 INJECTION INTRAMUSCULAR; INTRAVENOUS at 10:10

## 2019-10-29 RX ADMIN — SODIUM CHLORIDE 1000 ML: 9 INJECTION, SOLUTION INTRAVENOUS at 08:34

## 2019-10-29 RX ADMIN — ONDANSETRON HYDROCHLORIDE 4 MG: 2 SOLUTION INTRAMUSCULAR; INTRAVENOUS at 08:34

## 2019-10-31 ENCOUNTER — APPOINTMENT (OUTPATIENT)
Dept: CT IMAGING | Age: 34
End: 2019-10-31
Payer: COMMERCIAL

## 2019-10-31 ENCOUNTER — HOSPITAL ENCOUNTER (EMERGENCY)
Age: 34
Discharge: HOME | End: 2019-10-31
Attending: EMERGENCY MEDICINE
Payer: COMMERCIAL

## 2019-10-31 VITALS
HEIGHT: 63 IN | OXYGEN SATURATION: 100 % | SYSTOLIC BLOOD PRESSURE: 155 MMHG | HEART RATE: 90 BPM | RESPIRATION RATE: 18 BRPM | WEIGHT: 150 LBS | TEMPERATURE: 99.1 F | BODY MASS INDEX: 26.58 KG/M2 | DIASTOLIC BLOOD PRESSURE: 99 MMHG

## 2019-10-31 DIAGNOSIS — R03.0 ELEVATED BLOOD PRESSURE READING: ICD-10-CM

## 2019-10-31 DIAGNOSIS — K29.00 ACUTE GASTRITIS, PRESENCE OF BLEEDING UNSPECIFIED, UNSPECIFIED GASTRITIS TYPE: Primary | ICD-10-CM

## 2019-10-31 LAB
ALBUMIN SERPL-MCNC: 4.7 G/DL (ref 3.5–5.2)
ALP BLD-CCNC: 78 U/L (ref 35–104)
ALT SERPL-CCNC: 10 U/L (ref 5–33)
AMPHETAMINE SCREEN, URINE: NEGATIVE
ANION GAP SERPL CALCULATED.3IONS-SCNC: 14 MMOL/L (ref 7–19)
AST SERPL-CCNC: 14 U/L (ref 5–32)
BACTERIA: NEGATIVE /HPF
BARBITURATE SCREEN URINE: NEGATIVE
BASOPHILS ABSOLUTE: 0 K/UL (ref 0–0.2)
BASOPHILS RELATIVE PERCENT: 0.2 % (ref 0–1)
BENZODIAZEPINE SCREEN, URINE: NEGATIVE
BILIRUB SERPL-MCNC: 0.5 MG/DL (ref 0.2–1.2)
BILIRUBIN URINE: NEGATIVE
BLOOD, URINE: ABNORMAL
BUN BLDV-MCNC: 13 MG/DL (ref 6–20)
CALCIUM SERPL-MCNC: 9.2 MG/DL (ref 8.6–10)
CANNABINOID SCREEN URINE: NEGATIVE
CHLORIDE BLD-SCNC: 95 MMOL/L (ref 98–111)
CLARITY: CLEAR
CO2: 26 MMOL/L (ref 22–29)
COCAINE METABOLITE SCREEN URINE: NEGATIVE
COLOR: YELLOW
CREAT SERPL-MCNC: 0.7 MG/DL (ref 0.5–0.9)
EOSINOPHILS ABSOLUTE: 0 K/UL (ref 0–0.6)
EOSINOPHILS RELATIVE PERCENT: 0 % (ref 0–5)
EPITHELIAL CELLS, UA: 1 /HPF (ref 0–5)
GFR NON-AFRICAN AMERICAN: >60
GLUCOSE BLD-MCNC: 93 MG/DL (ref 74–109)
GLUCOSE URINE: NEGATIVE MG/DL
HCG QUALITATIVE: NEGATIVE
HCT VFR BLD CALC: 47.9 % (ref 37–47)
HEMOGLOBIN: 16.1 G/DL (ref 12–16)
HYALINE CASTS: 1 /HPF (ref 0–8)
IMMATURE GRANULOCYTES #: 0 K/UL
KETONES, URINE: 40 MG/DL
LEUKOCYTE ESTERASE, URINE: NEGATIVE
LIPASE: 78 U/L (ref 13–60)
LYMPHOCYTES ABSOLUTE: 2 K/UL (ref 1.1–4.5)
LYMPHOCYTES RELATIVE PERCENT: 16.8 % (ref 20–40)
Lab: NORMAL
MCH RBC QN AUTO: 27.7 PG (ref 27–31)
MCHC RBC AUTO-ENTMCNC: 33.6 G/DL (ref 33–37)
MCV RBC AUTO: 82.3 FL (ref 81–99)
MONOCYTES ABSOLUTE: 0.7 K/UL (ref 0–0.9)
MONOCYTES RELATIVE PERCENT: 5.9 % (ref 0–10)
NEUTROPHILS ABSOLUTE: 8.9 K/UL (ref 1.5–7.5)
NEUTROPHILS RELATIVE PERCENT: 76.8 % (ref 50–65)
NITRITE, URINE: NEGATIVE
OPIATE SCREEN URINE: NEGATIVE
PDW BLD-RTO: 14.2 % (ref 11.5–14.5)
PH UA: 7 (ref 5–8)
PLATELET # BLD: 350 K/UL (ref 130–400)
PMV BLD AUTO: 10 FL (ref 9.4–12.3)
POTASSIUM SERPL-SCNC: 3.5 MMOL/L (ref 3.5–5)
PROTEIN UA: NEGATIVE MG/DL
RBC # BLD: 5.82 M/UL (ref 4.2–5.4)
RBC UA: 2 /HPF (ref 0–4)
REASON FOR REJECTION: NORMAL
REJECTED TEST: NORMAL
SODIUM BLD-SCNC: 135 MMOL/L (ref 136–145)
SPECIFIC GRAVITY UA: >1.045 (ref 1–1.03)
TOTAL PROTEIN: 7.8 G/DL (ref 6.6–8.7)
URINE REFLEX TO CULTURE: ABNORMAL
UROBILINOGEN, URINE: 0.2 E.U./DL
WBC # BLD: 11.6 K/UL (ref 4.8–10.8)
WBC UA: 1 /HPF (ref 0–5)

## 2019-10-31 PROCEDURE — 99284 EMERGENCY DEPT VISIT MOD MDM: CPT

## 2019-10-31 PROCEDURE — 80053 COMPREHEN METABOLIC PANEL: CPT

## 2019-10-31 PROCEDURE — 96374 THER/PROPH/DIAG INJ IV PUSH: CPT

## 2019-10-31 PROCEDURE — 6360000002 HC RX W HCPCS

## 2019-10-31 PROCEDURE — 83690 ASSAY OF LIPASE: CPT

## 2019-10-31 PROCEDURE — 74177 CT ABD & PELVIS W/CONTRAST: CPT

## 2019-10-31 PROCEDURE — 6360000004 HC RX CONTRAST MEDICATION: Performed by: EMERGENCY MEDICINE

## 2019-10-31 PROCEDURE — 96376 TX/PRO/DX INJ SAME DRUG ADON: CPT

## 2019-10-31 PROCEDURE — 6360000002 HC RX W HCPCS: Performed by: EMERGENCY MEDICINE

## 2019-10-31 PROCEDURE — 85025 COMPLETE CBC W/AUTO DIFF WBC: CPT

## 2019-10-31 PROCEDURE — 80307 DRUG TEST PRSMV CHEM ANLYZR: CPT

## 2019-10-31 PROCEDURE — 81001 URINALYSIS AUTO W/SCOPE: CPT

## 2019-10-31 PROCEDURE — 84703 CHORIONIC GONADOTROPIN ASSAY: CPT

## 2019-10-31 PROCEDURE — 36415 COLL VENOUS BLD VENIPUNCTURE: CPT

## 2019-10-31 PROCEDURE — 96375 TX/PRO/DX INJ NEW DRUG ADDON: CPT

## 2019-10-31 RX ORDER — 0.9 % SODIUM CHLORIDE 0.9 %
1000 INTRAVENOUS SOLUTION INTRAVENOUS ONCE
Status: DISCONTINUED | OUTPATIENT
Start: 2019-10-31 | End: 2019-10-31 | Stop reason: HOSPADM

## 2019-10-31 RX ORDER — HYDRALAZINE HYDROCHLORIDE 20 MG/ML
10 INJECTION INTRAMUSCULAR; INTRAVENOUS ONCE
Status: COMPLETED | OUTPATIENT
Start: 2019-10-31 | End: 2019-10-31

## 2019-10-31 RX ORDER — PROMETHAZINE HYDROCHLORIDE 25 MG/1
25 SUPPOSITORY RECTAL EVERY 6 HOURS PRN
Qty: 20 SUPPOSITORY | Refills: 0 | Status: ON HOLD | OUTPATIENT
Start: 2019-10-31 | End: 2019-11-05 | Stop reason: HOSPADM

## 2019-10-31 RX ORDER — ONDANSETRON 2 MG/ML
4 INJECTION INTRAMUSCULAR; INTRAVENOUS ONCE
Status: COMPLETED | OUTPATIENT
Start: 2019-10-31 | End: 2019-10-31

## 2019-10-31 RX ORDER — HYDROCODONE BITARTRATE AND ACETAMINOPHEN 7.5; 325 MG/1; MG/1
1 TABLET ORAL EVERY 6 HOURS PRN
Qty: 12 TABLET | Refills: 0 | Status: SHIPPED | OUTPATIENT
Start: 2019-10-31 | End: 2019-11-03

## 2019-10-31 RX ORDER — ONDANSETRON 4 MG/1
4 TABLET, FILM COATED ORAL EVERY 8 HOURS PRN
Status: ON HOLD | COMMUNITY
End: 2020-07-10 | Stop reason: SDUPTHER

## 2019-10-31 RX ORDER — PROMETHAZINE HYDROCHLORIDE 25 MG/ML
12.5 INJECTION, SOLUTION INTRAMUSCULAR; INTRAVENOUS ONCE
Status: COMPLETED | OUTPATIENT
Start: 2019-10-31 | End: 2019-10-31

## 2019-10-31 RX ORDER — ONDANSETRON 2 MG/ML
INJECTION INTRAMUSCULAR; INTRAVENOUS
Status: COMPLETED
Start: 2019-10-31 | End: 2019-10-31

## 2019-10-31 RX ADMIN — PROMETHAZINE HYDROCHLORIDE 12.5 MG: 25 INJECTION INTRAMUSCULAR; INTRAVENOUS at 12:28

## 2019-10-31 RX ADMIN — IOPAMIDOL 90 ML: 755 INJECTION, SOLUTION INTRAVENOUS at 13:45

## 2019-10-31 RX ADMIN — ONDANSETRON 4 MG: 2 INJECTION INTRAMUSCULAR; INTRAVENOUS at 13:42

## 2019-10-31 RX ADMIN — HYDRALAZINE HYDROCHLORIDE 10 MG: 20 INJECTION INTRAMUSCULAR; INTRAVENOUS at 13:42

## 2019-10-31 RX ADMIN — HYDRALAZINE HYDROCHLORIDE 10 MG: 20 INJECTION INTRAMUSCULAR; INTRAVENOUS at 16:21

## 2019-10-31 ASSESSMENT — ENCOUNTER SYMPTOMS
SORE THROAT: 0
CONSTIPATION: 0
ABDOMINAL PAIN: 1
NAUSEA: 1
DIARRHEA: 0
SHORTNESS OF BREATH: 0
VOMITING: 1

## 2019-10-31 ASSESSMENT — PAIN SCALES - GENERAL: PAINLEVEL_OUTOF10: 8

## 2019-10-31 ASSESSMENT — PAIN DESCRIPTION - LOCATION: LOCATION: ABDOMEN

## 2019-11-04 ENCOUNTER — APPOINTMENT (OUTPATIENT)
Dept: GENERAL RADIOLOGY | Age: 34
End: 2019-11-04
Payer: COMMERCIAL

## 2019-11-04 ENCOUNTER — HOSPITAL ENCOUNTER (OUTPATIENT)
Age: 34
Setting detail: OBSERVATION
Discharge: HOME | End: 2019-11-05
Attending: EMERGENCY MEDICINE | Admitting: INTERNAL MEDICINE
Payer: COMMERCIAL

## 2019-11-04 DIAGNOSIS — F32.A DEPRESSION, UNSPECIFIED DEPRESSION TYPE: ICD-10-CM

## 2019-11-04 DIAGNOSIS — I10 UNCONTROLLED HYPERTENSION: ICD-10-CM

## 2019-11-04 DIAGNOSIS — M54.2 NECK PAIN: ICD-10-CM

## 2019-11-04 DIAGNOSIS — R10.9 ABDOMINAL PAIN WITH VOMITING: ICD-10-CM

## 2019-11-04 DIAGNOSIS — M62.838 MUSCLE SPASM: Primary | ICD-10-CM

## 2019-11-04 DIAGNOSIS — R11.10 ABDOMINAL PAIN WITH VOMITING: ICD-10-CM

## 2019-11-04 PROBLEM — R11.15 INTRACTABLE CYCLICAL VOMITING WITH NAUSEA: Status: ACTIVE | Noted: 2019-11-04

## 2019-11-04 LAB
ALBUMIN SERPL-MCNC: 5 G/DL (ref 3.5–5.2)
ALP BLD-CCNC: 91 U/L (ref 35–104)
ALT SERPL-CCNC: 15 U/L (ref 5–33)
AMPHETAMINE SCREEN, URINE: NEGATIVE
ANION GAP SERPL CALCULATED.3IONS-SCNC: 16 MMOL/L (ref 7–19)
AST SERPL-CCNC: 19 U/L (ref 5–32)
BARBITURATE SCREEN URINE: NEGATIVE
BENZODIAZEPINE SCREEN, URINE: NEGATIVE
BILIRUB SERPL-MCNC: 0.4 MG/DL (ref 0.2–1.2)
BILIRUBIN URINE: NEGATIVE
BLOOD, URINE: NEGATIVE
BUN BLDV-MCNC: 7 MG/DL (ref 6–20)
CALCIUM SERPL-MCNC: 9.5 MG/DL (ref 8.6–10)
CANNABINOID SCREEN URINE: NEGATIVE
CHLORIDE BLD-SCNC: 98 MMOL/L (ref 98–111)
CLARITY: ABNORMAL
CO2: 26 MMOL/L (ref 22–29)
COCAINE METABOLITE SCREEN URINE: NEGATIVE
COLOR: YELLOW
CREAT SERPL-MCNC: 0.6 MG/DL (ref 0.5–0.9)
ETHANOL: <10 MG/DL (ref 0–0.08)
GFR NON-AFRICAN AMERICAN: >60
GLUCOSE BLD-MCNC: 107 MG/DL (ref 74–109)
GLUCOSE URINE: NEGATIVE MG/DL
HCG QUALITATIVE: NEGATIVE
HCT VFR BLD CALC: 45.3 % (ref 37–47)
HEMOGLOBIN: 15.3 G/DL (ref 12–16)
KETONES, URINE: ABNORMAL MG/DL
LACTIC ACID: 1.5 MMOL/L (ref 0.5–1.9)
LEUKOCYTE ESTERASE, URINE: NEGATIVE
LIPASE: 90 U/L (ref 13–60)
Lab: NORMAL
MCH RBC QN AUTO: 28.3 PG (ref 27–31)
MCHC RBC AUTO-ENTMCNC: 33.8 G/DL (ref 33–37)
MCV RBC AUTO: 83.9 FL (ref 81–99)
NITRITE, URINE: NEGATIVE
OPIATE SCREEN URINE: NEGATIVE
PDW BLD-RTO: 14.6 % (ref 11.5–14.5)
PH UA: 8 (ref 5–8)
PLATELET # BLD: 305 K/UL (ref 130–400)
PMV BLD AUTO: 10.2 FL (ref 9.4–12.3)
POTASSIUM SERPL-SCNC: 3.3 MMOL/L (ref 3.5–5)
PROTEIN UA: NEGATIVE MG/DL
RAPID INFLUENZA  B AGN: NEGATIVE
RAPID INFLUENZA A AGN: NEGATIVE
RBC # BLD: 5.4 M/UL (ref 4.2–5.4)
SODIUM BLD-SCNC: 140 MMOL/L (ref 136–145)
SPECIFIC GRAVITY UA: 1.02 (ref 1–1.03)
TOTAL PROTEIN: 8.3 G/DL (ref 6.6–8.7)
URINE REFLEX TO CULTURE: YES
UROBILINOGEN, URINE: 0.2 E.U./DL
WBC # BLD: 7.4 K/UL (ref 4.8–10.8)

## 2019-11-04 PROCEDURE — 2500000003 HC RX 250 WO HCPCS: Performed by: FAMILY MEDICINE

## 2019-11-04 PROCEDURE — 2580000003 HC RX 258: Performed by: EMERGENCY MEDICINE

## 2019-11-04 PROCEDURE — 96361 HYDRATE IV INFUSION ADD-ON: CPT

## 2019-11-04 PROCEDURE — 80307 DRUG TEST PRSMV CHEM ANLYZR: CPT

## 2019-11-04 PROCEDURE — 87040 BLOOD CULTURE FOR BACTERIA: CPT

## 2019-11-04 PROCEDURE — 83690 ASSAY OF LIPASE: CPT

## 2019-11-04 PROCEDURE — 96372 THER/PROPH/DIAG INJ SC/IM: CPT

## 2019-11-04 PROCEDURE — 87804 INFLUENZA ASSAY W/OPTIC: CPT

## 2019-11-04 PROCEDURE — G0378 HOSPITAL OBSERVATION PER HR: HCPCS

## 2019-11-04 PROCEDURE — 83605 ASSAY OF LACTIC ACID: CPT

## 2019-11-04 PROCEDURE — 2580000003 HC RX 258: Performed by: FAMILY MEDICINE

## 2019-11-04 PROCEDURE — 96375 TX/PRO/DX INJ NEW DRUG ADDON: CPT

## 2019-11-04 PROCEDURE — 96376 TX/PRO/DX INJ SAME DRUG ADON: CPT

## 2019-11-04 PROCEDURE — 85027 COMPLETE CBC AUTOMATED: CPT

## 2019-11-04 PROCEDURE — 99285 EMERGENCY DEPT VISIT HI MDM: CPT

## 2019-11-04 PROCEDURE — 80053 COMPREHEN METABOLIC PANEL: CPT

## 2019-11-04 PROCEDURE — 6360000002 HC RX W HCPCS: Performed by: EMERGENCY MEDICINE

## 2019-11-04 PROCEDURE — 6370000000 HC RX 637 (ALT 250 FOR IP): Performed by: FAMILY MEDICINE

## 2019-11-04 PROCEDURE — 6360000002 HC RX W HCPCS: Performed by: FAMILY MEDICINE

## 2019-11-04 PROCEDURE — 81003 URINALYSIS AUTO W/O SCOPE: CPT

## 2019-11-04 PROCEDURE — G0480 DRUG TEST DEF 1-7 CLASSES: HCPCS

## 2019-11-04 PROCEDURE — 73030 X-RAY EXAM OF SHOULDER: CPT

## 2019-11-04 PROCEDURE — 74018 RADEX ABDOMEN 1 VIEW: CPT

## 2019-11-04 PROCEDURE — 96374 THER/PROPH/DIAG INJ IV PUSH: CPT

## 2019-11-04 PROCEDURE — 36415 COLL VENOUS BLD VENIPUNCTURE: CPT

## 2019-11-04 PROCEDURE — 84703 CHORIONIC GONADOTROPIN ASSAY: CPT

## 2019-11-04 RX ORDER — MAGNESIUM SULFATE 1 G/100ML
1 INJECTION INTRAVENOUS PRN
Status: DISCONTINUED | OUTPATIENT
Start: 2019-11-04 | End: 2019-11-05 | Stop reason: HOSPADM

## 2019-11-04 RX ORDER — SODIUM CHLORIDE 0.9 % (FLUSH) 0.9 %
10 SYRINGE (ML) INJECTION PRN
Status: DISCONTINUED | OUTPATIENT
Start: 2019-11-04 | End: 2019-11-05 | Stop reason: HOSPADM

## 2019-11-04 RX ORDER — 0.9 % SODIUM CHLORIDE 0.9 %
1000 INTRAVENOUS SOLUTION INTRAVENOUS ONCE
Status: COMPLETED | OUTPATIENT
Start: 2019-11-04 | End: 2019-11-04

## 2019-11-04 RX ORDER — POTASSIUM CHLORIDE 7.45 MG/ML
10 INJECTION INTRAVENOUS PRN
Status: DISCONTINUED | OUTPATIENT
Start: 2019-11-04 | End: 2019-11-05 | Stop reason: HOSPADM

## 2019-11-04 RX ORDER — ORPHENADRINE CITRATE 30 MG/ML
60 INJECTION INTRAMUSCULAR; INTRAVENOUS ONCE
Status: COMPLETED | OUTPATIENT
Start: 2019-11-04 | End: 2019-11-04

## 2019-11-04 RX ORDER — SODIUM CHLORIDE 0.9 % (FLUSH) 0.9 %
10 SYRINGE (ML) INJECTION EVERY 12 HOURS SCHEDULED
Status: DISCONTINUED | OUTPATIENT
Start: 2019-11-04 | End: 2019-11-05 | Stop reason: HOSPADM

## 2019-11-04 RX ORDER — POTASSIUM CHLORIDE 20 MEQ/1
40 TABLET, EXTENDED RELEASE ORAL PRN
Status: DISCONTINUED | OUTPATIENT
Start: 2019-11-04 | End: 2019-11-05 | Stop reason: HOSPADM

## 2019-11-04 RX ORDER — POLYETHYLENE GLYCOL 3350 17 G/17G
17 POWDER, FOR SOLUTION ORAL 2 TIMES DAILY
Status: DISCONTINUED | OUTPATIENT
Start: 2019-11-04 | End: 2019-11-05 | Stop reason: HOSPADM

## 2019-11-04 RX ORDER — ONDANSETRON 2 MG/ML
4 INJECTION INTRAMUSCULAR; INTRAVENOUS ONCE
Status: COMPLETED | OUTPATIENT
Start: 2019-11-04 | End: 2019-11-04

## 2019-11-04 RX ORDER — HYDROCODONE BITARTRATE AND ACETAMINOPHEN 5; 325 MG/1; MG/1
1.5 TABLET ORAL EVERY 6 HOURS PRN
Status: DISCONTINUED | OUTPATIENT
Start: 2019-11-04 | End: 2019-11-05 | Stop reason: HOSPADM

## 2019-11-04 RX ORDER — CYCLOBENZAPRINE HCL 10 MG
10 TABLET ORAL 2 TIMES DAILY
Status: DISCONTINUED | OUTPATIENT
Start: 2019-11-04 | End: 2019-11-05 | Stop reason: HOSPADM

## 2019-11-04 RX ORDER — LANOLIN ALCOHOL/MO/W.PET/CERES
6 CREAM (GRAM) TOPICAL NIGHTLY
Status: DISCONTINUED | OUTPATIENT
Start: 2019-11-04 | End: 2019-11-05 | Stop reason: HOSPADM

## 2019-11-04 RX ORDER — AMLODIPINE BESYLATE 10 MG/1
10 TABLET ORAL DAILY
Status: DISCONTINUED | OUTPATIENT
Start: 2019-11-04 | End: 2019-11-05 | Stop reason: HOSPADM

## 2019-11-04 RX ORDER — ONDANSETRON 2 MG/ML
4 INJECTION INTRAMUSCULAR; INTRAVENOUS EVERY 6 HOURS PRN
Status: DISCONTINUED | OUTPATIENT
Start: 2019-11-04 | End: 2019-11-05 | Stop reason: HOSPADM

## 2019-11-04 RX ORDER — HYDROCHLOROTHIAZIDE 25 MG/1
25 TABLET ORAL DAILY
Status: DISCONTINUED | OUTPATIENT
Start: 2019-11-04 | End: 2019-11-05 | Stop reason: HOSPADM

## 2019-11-04 RX ORDER — HYDROCODONE BITARTRATE AND ACETAMINOPHEN 5; 325 MG/1; MG/1
1.5 TABLET ORAL EVERY 6 HOURS PRN
Status: ON HOLD | COMMUNITY
End: 2019-11-05 | Stop reason: HOSPADM

## 2019-11-04 RX ORDER — PROMETHAZINE HYDROCHLORIDE 25 MG/ML
12.5 INJECTION, SOLUTION INTRAMUSCULAR; INTRAVENOUS ONCE
Status: COMPLETED | OUTPATIENT
Start: 2019-11-04 | End: 2019-11-04

## 2019-11-04 RX ORDER — METOPROLOL TARTRATE 5 MG/5ML
5 INJECTION INTRAVENOUS EVERY 6 HOURS PRN
Status: DISCONTINUED | OUTPATIENT
Start: 2019-11-04 | End: 2019-11-05 | Stop reason: HOSPADM

## 2019-11-04 RX ORDER — HYDRALAZINE HYDROCHLORIDE 20 MG/ML
5 INJECTION INTRAMUSCULAR; INTRAVENOUS ONCE
Status: COMPLETED | OUTPATIENT
Start: 2019-11-04 | End: 2019-11-04

## 2019-11-04 RX ORDER — ACETAMINOPHEN 325 MG/1
650 TABLET ORAL EVERY 4 HOURS PRN
Status: DISCONTINUED | OUTPATIENT
Start: 2019-11-04 | End: 2019-11-05 | Stop reason: HOSPADM

## 2019-11-04 RX ORDER — HYDROCHLOROTHIAZIDE 25 MG/1
25 TABLET ORAL DAILY
Status: CANCELLED | OUTPATIENT
Start: 2019-11-04

## 2019-11-04 RX ADMIN — HYDRALAZINE HYDROCHLORIDE 5 MG: 20 INJECTION INTRAMUSCULAR; INTRAVENOUS at 16:21

## 2019-11-04 RX ADMIN — POTASSIUM CHLORIDE 40 MEQ: 1500 TABLET, EXTENDED RELEASE ORAL at 22:40

## 2019-11-04 RX ADMIN — ORPHENADRINE CITRATE 60 MG: 30 INJECTION INTRAMUSCULAR; INTRAVENOUS at 16:21

## 2019-11-04 RX ADMIN — SODIUM CHLORIDE 1000 ML: 9 INJECTION, SOLUTION INTRAVENOUS at 13:31

## 2019-11-04 RX ADMIN — ONDANSETRON 4 MG: 2 INJECTION INTRAMUSCULAR; INTRAVENOUS at 16:21

## 2019-11-04 RX ADMIN — CYCLOBENZAPRINE HYDROCHLORIDE 10 MG: 10 TABLET, FILM COATED ORAL at 22:39

## 2019-11-04 RX ADMIN — ONDANSETRON 4 MG: 2 INJECTION INTRAMUSCULAR; INTRAVENOUS at 13:32

## 2019-11-04 RX ADMIN — AMLODIPINE BESYLATE 10 MG: 10 TABLET ORAL at 22:40

## 2019-11-04 RX ADMIN — HYDROCHLOROTHIAZIDE 25 MG: 25 TABLET ORAL at 22:39

## 2019-11-04 RX ADMIN — ENOXAPARIN SODIUM 40 MG: 40 INJECTION SUBCUTANEOUS at 22:39

## 2019-11-04 RX ADMIN — FAMOTIDINE 20 MG: 10 INJECTION INTRAVENOUS at 22:39

## 2019-11-04 RX ADMIN — Medication 10 ML: at 22:40

## 2019-11-04 RX ADMIN — POLYETHYLENE GLYCOL 3350 17 G: 17 POWDER, FOR SOLUTION ORAL at 22:40

## 2019-11-04 RX ADMIN — MELATONIN 6 MG: at 22:39

## 2019-11-04 RX ADMIN — PROMETHAZINE HYDROCHLORIDE 12.5 MG: 25 INJECTION INTRAMUSCULAR; INTRAVENOUS at 13:31

## 2019-11-04 ASSESSMENT — ENCOUNTER SYMPTOMS
EYE REDNESS: 0
COUGH: 0
DIARRHEA: 0
SORE THROAT: 0
EYE PAIN: 0
COLOR CHANGE: 0
BLOOD IN STOOL: 0
NAUSEA: 1
ABDOMINAL PAIN: 1
WHEEZING: 0
SHORTNESS OF BREATH: 0
TROUBLE SWALLOWING: 0
VOMITING: 1

## 2019-11-04 ASSESSMENT — PAIN DESCRIPTION - LOCATION
LOCATION: NECK
LOCATION: NECK

## 2019-11-04 ASSESSMENT — PAIN SCALES - GENERAL: PAINLEVEL_OUTOF10: 4

## 2019-11-04 ASSESSMENT — PAIN DESCRIPTION - PAIN TYPE
TYPE: ACUTE PAIN
TYPE: ACUTE PAIN

## 2019-11-04 ASSESSMENT — PAIN DESCRIPTION - FREQUENCY: FREQUENCY: CONTINUOUS

## 2019-11-04 ASSESSMENT — PAIN DESCRIPTION - DESCRIPTORS: DESCRIPTORS: ACHING

## 2019-11-05 VITALS
BODY MASS INDEX: 26.14 KG/M2 | SYSTOLIC BLOOD PRESSURE: 131 MMHG | RESPIRATION RATE: 12 BRPM | DIASTOLIC BLOOD PRESSURE: 90 MMHG | TEMPERATURE: 97.8 F | WEIGHT: 147.56 LBS | HEIGHT: 63 IN | HEART RATE: 106 BPM | OXYGEN SATURATION: 99 %

## 2019-11-05 LAB
ALBUMIN SERPL-MCNC: 4.5 G/DL (ref 3.5–5.2)
ALP BLD-CCNC: 74 U/L (ref 35–104)
ALT SERPL-CCNC: 13 U/L (ref 5–33)
ANION GAP SERPL CALCULATED.3IONS-SCNC: 16 MMOL/L (ref 7–19)
AST SERPL-CCNC: 17 U/L (ref 5–32)
BILIRUB SERPL-MCNC: 0.3 MG/DL (ref 0.2–1.2)
BUN BLDV-MCNC: 6 MG/DL (ref 6–20)
CALCIUM SERPL-MCNC: 9.3 MG/DL (ref 8.6–10)
CHLORIDE BLD-SCNC: 97 MMOL/L (ref 98–111)
CO2: 25 MMOL/L (ref 22–29)
CREAT SERPL-MCNC: 0.7 MG/DL (ref 0.5–0.9)
GFR NON-AFRICAN AMERICAN: >60
GLUCOSE BLD-MCNC: 97 MG/DL (ref 74–109)
HCT VFR BLD CALC: 42.7 % (ref 37–47)
HEMOGLOBIN: 14.1 G/DL (ref 12–16)
LIPASE: 97 U/L (ref 13–60)
MCH RBC QN AUTO: 27.9 PG (ref 27–31)
MCHC RBC AUTO-ENTMCNC: 33 G/DL (ref 33–37)
MCV RBC AUTO: 84.4 FL (ref 81–99)
PDW BLD-RTO: 14.6 % (ref 11.5–14.5)
PLATELET # BLD: 255 K/UL (ref 130–400)
PMV BLD AUTO: 10 FL (ref 9.4–12.3)
POTASSIUM SERPL-SCNC: 3.8 MMOL/L (ref 3.5–5)
RBC # BLD: 5.06 M/UL (ref 4.2–5.4)
SODIUM BLD-SCNC: 138 MMOL/L (ref 136–145)
TOTAL PROTEIN: 6.9 G/DL (ref 6.6–8.7)
WBC # BLD: 8 K/UL (ref 4.8–10.8)

## 2019-11-05 PROCEDURE — 2580000003 HC RX 258: Performed by: FAMILY MEDICINE

## 2019-11-05 PROCEDURE — 36415 COLL VENOUS BLD VENIPUNCTURE: CPT

## 2019-11-05 PROCEDURE — 83690 ASSAY OF LIPASE: CPT

## 2019-11-05 PROCEDURE — 80053 COMPREHEN METABOLIC PANEL: CPT

## 2019-11-05 PROCEDURE — 85027 COMPLETE CBC AUTOMATED: CPT

## 2019-11-05 PROCEDURE — G0378 HOSPITAL OBSERVATION PER HR: HCPCS

## 2019-11-05 PROCEDURE — 6370000000 HC RX 637 (ALT 250 FOR IP): Performed by: FAMILY MEDICINE

## 2019-11-05 PROCEDURE — 2500000003 HC RX 250 WO HCPCS: Performed by: FAMILY MEDICINE

## 2019-11-05 PROCEDURE — 96376 TX/PRO/DX INJ SAME DRUG ADON: CPT

## 2019-11-05 RX ORDER — PANTOPRAZOLE SODIUM 40 MG/1
40 TABLET, DELAYED RELEASE ORAL
Qty: 14 TABLET | Refills: 0 | Status: SHIPPED | OUTPATIENT
Start: 2019-11-05 | End: 2019-11-05 | Stop reason: SDUPTHER

## 2019-11-05 RX ORDER — AMLODIPINE BESYLATE 10 MG/1
10 TABLET ORAL DAILY
Qty: 30 TABLET | Refills: 3 | Status: SHIPPED | OUTPATIENT
Start: 2019-11-06 | End: 2019-11-05

## 2019-11-05 RX ORDER — PANTOPRAZOLE SODIUM 40 MG/1
40 TABLET, DELAYED RELEASE ORAL
Qty: 14 TABLET | Refills: 0 | Status: SHIPPED | OUTPATIENT
Start: 2019-11-05 | End: 2019-11-19

## 2019-11-05 RX ORDER — AMLODIPINE BESYLATE 10 MG/1
10 TABLET ORAL DAILY
Qty: 30 TABLET | Refills: 3 | Status: SHIPPED | OUTPATIENT
Start: 2019-11-06

## 2019-11-05 RX ADMIN — HYDROCHLOROTHIAZIDE 25 MG: 25 TABLET ORAL at 07:45

## 2019-11-05 RX ADMIN — HYDROCODONE BITARTRATE AND ACETAMINOPHEN 1.5 TABLET: 5; 325 TABLET ORAL at 07:45

## 2019-11-05 RX ADMIN — POLYETHYLENE GLYCOL 3350 17 G: 17 POWDER, FOR SOLUTION ORAL at 07:48

## 2019-11-05 RX ADMIN — AMLODIPINE BESYLATE 10 MG: 10 TABLET ORAL at 07:45

## 2019-11-05 RX ADMIN — FAMOTIDINE 20 MG: 10 INJECTION INTRAVENOUS at 07:46

## 2019-11-05 RX ADMIN — Medication 10 ML: at 07:46

## 2019-11-05 RX ADMIN — HYDROCODONE BITARTRATE AND ACETAMINOPHEN 1.5 TABLET: 5; 325 TABLET ORAL at 00:08

## 2019-11-05 ASSESSMENT — PAIN SCALES - GENERAL
PAINLEVEL_OUTOF10: 7
PAINLEVEL_OUTOF10: 3
PAINLEVEL_OUTOF10: 4
PAINLEVEL_OUTOF10: 6

## 2019-11-05 ASSESSMENT — PAIN DESCRIPTION - DESCRIPTORS: DESCRIPTORS: ACHING

## 2019-11-05 ASSESSMENT — PAIN DESCRIPTION - LOCATION: LOCATION: NECK

## 2019-11-05 ASSESSMENT — PAIN DESCRIPTION - PAIN TYPE: TYPE: CHRONIC PAIN

## 2019-11-05 ASSESSMENT — PAIN DESCRIPTION - FREQUENCY: FREQUENCY: CONTINUOUS

## 2019-11-09 LAB
BLOOD CULTURE, ROUTINE: NORMAL
CULTURE, BLOOD 2: NORMAL

## 2019-11-12 ENCOUNTER — DOCUMENTATION (OUTPATIENT)
Dept: PHYSICAL THERAPY | Facility: CLINIC | Age: 34
End: 2019-11-12

## 2019-11-12 NOTE — PROGRESS NOTES
Outpatient Physical Therapy Discharge Summary         Patient Name: Selin Meyer  : 1985  MRN: [id number]    Today's Date: 2019    Visit Dx:  No diagnosis found.    PT OP Goals     Row Name 19 1100          PT Short Term Goals    STG Date to Achieve  08/15/19  -EC     STG 1  Patient will begin a home exercie program to accelerate the recovery process  -EC     STG 1 Progress  Not Met  -EC     STG 2  Patient will have a 50% reduction in frequency and intensity of neck pain and spasms  -EC     STG 2 Progress  Not Met  -EC        Long Term Goals    LTG Date to Achieve  19  -EC     LTG 1  Patient will gain an understanding of proper body mechanics, lifting techniques, posture principles and spine care  -EC     LTG 1 Progress  Not Met  -EC     LTG 2  Patient will regain normal cervical ROM without muscle tension  -EC     LTG 2 Progress  Not Met  -EC     LTG 3  Patient will have an increase in cervical, scapular and core stability  -EC     LTG 3 Progress  Not Met  -EC     LTG 4  Patient will have an 85 - 100% reduction in fibrosis and palpable tenderness of cervical and scapular musculature  -EC     LTG 4 Progress  Not Met  -EC     LTG 5  Patient will be instructed in a long term exercise program to continue to make gains following discharge from therapy  -EC     LTG 5 Progress  Not Met  -EC       User Key  (r) = Recorded By, (t) = Taken By, (c) = Cosigned By    Initials Name Provider Type    Paul Krause, PTA Physical Therapy Assistant          OP PT Discharge Summary  Date of Discharge: 19  Reason for Discharge: Unable to pay/insurance denied care  Outcomes Achieved: Refer to plan of care for updates on goals achieved  Discharge Destination: Home with home program  Discharge Instructions/Additional Comments: We have been working with this patient to relieve her cervical pain by restoring her ROM as well as improving her postural control and thoracic mobility as well. She  did make progress towards all of her POC goals but did not achieve or partially achieve any of her goals before she used all our of her authorized visits.      Time Calculation:                    Paul Tapia, PTA  11/12/2019

## 2019-11-14 NOTE — PROGRESS NOTES
I have reviewed the notes, assessments, and/or procedures performed by Paul Tapia PTA, I concur with her/his documentation of Selin Meyer.

## 2020-07-05 ENCOUNTER — APPOINTMENT (OUTPATIENT)
Dept: GENERAL RADIOLOGY | Age: 35
DRG: 392 | End: 2020-07-05
Payer: COMMERCIAL

## 2020-07-05 ENCOUNTER — APPOINTMENT (OUTPATIENT)
Dept: CT IMAGING | Age: 35
DRG: 392 | End: 2020-07-05
Payer: COMMERCIAL

## 2020-07-05 ENCOUNTER — HOSPITAL ENCOUNTER (INPATIENT)
Age: 35
LOS: 2 days | Discharge: HOME | DRG: 392 | End: 2020-07-10
Attending: HOSPITALIST | Admitting: HOSPITALIST
Payer: COMMERCIAL

## 2020-07-05 PROBLEM — Z20.822 CLOSE EXPOSURE TO COVID-19 VIRUS: Status: ACTIVE | Noted: 2020-07-05

## 2020-07-05 PROBLEM — K52.9 GASTROENTERITIS: Status: ACTIVE | Noted: 2020-07-05

## 2020-07-05 LAB
ALBUMIN SERPL-MCNC: 4.9 G/DL (ref 3.5–5.2)
ALBUMIN SERPL-MCNC: 5.2 G/DL (ref 3.5–5.2)
ALP BLD-CCNC: 118 U/L (ref 35–104)
ALP BLD-CCNC: 122 U/L (ref 35–104)
ALT SERPL-CCNC: 24 U/L (ref 5–33)
ALT SERPL-CCNC: 26 U/L (ref 5–33)
AMPHETAMINE SCREEN, URINE: NEGATIVE
AMYLASE: 48 U/L (ref 28–100)
ANION GAP SERPL CALCULATED.3IONS-SCNC: 19 MMOL/L (ref 7–19)
ANION GAP SERPL CALCULATED.3IONS-SCNC: 21 MMOL/L (ref 7–19)
APTT: 25.4 SEC (ref 26–36.2)
AST SERPL-CCNC: 30 U/L (ref 5–32)
AST SERPL-CCNC: 32 U/L (ref 5–32)
BARBITURATE SCREEN URINE: NEGATIVE
BASOPHILS ABSOLUTE: 0 K/UL (ref 0–0.2)
BASOPHILS ABSOLUTE: 0.1 K/UL (ref 0–0.2)
BASOPHILS RELATIVE PERCENT: 0.1 % (ref 0–1)
BASOPHILS RELATIVE PERCENT: 0.6 % (ref 0–1)
BENZODIAZEPINE SCREEN, URINE: NEGATIVE
BILIRUB SERPL-MCNC: 0.3 MG/DL (ref 0.2–1.2)
BILIRUB SERPL-MCNC: 0.4 MG/DL (ref 0.2–1.2)
BILIRUBIN URINE: NEGATIVE
BLOOD, URINE: NEGATIVE
BUN BLDV-MCNC: 7 MG/DL (ref 6–20)
BUN BLDV-MCNC: 9 MG/DL (ref 6–20)
CALCIUM SERPL-MCNC: 9.4 MG/DL (ref 8.6–10)
CALCIUM SERPL-MCNC: 9.5 MG/DL (ref 8.6–10)
CANNABINOID SCREEN URINE: NEGATIVE
CHLORIDE BLD-SCNC: 94 MMOL/L (ref 98–111)
CHLORIDE BLD-SCNC: 97 MMOL/L (ref 98–111)
CLARITY: CLEAR
CO2: 21 MMOL/L (ref 22–29)
CO2: 22 MMOL/L (ref 22–29)
COCAINE METABOLITE SCREEN URINE: NEGATIVE
COLOR: YELLOW
CREAT SERPL-MCNC: 0.6 MG/DL (ref 0.5–0.9)
CREAT SERPL-MCNC: 0.6 MG/DL (ref 0.5–0.9)
D DIMER: 0.95 UG/ML FEU (ref 0–0.48)
EOSINOPHILS ABSOLUTE: 0 K/UL (ref 0–0.6)
EOSINOPHILS ABSOLUTE: 0 K/UL (ref 0–0.6)
EOSINOPHILS RELATIVE PERCENT: 0 % (ref 0–5)
EOSINOPHILS RELATIVE PERCENT: 0.2 % (ref 0–5)
FERRITIN: 22.7 NG/ML (ref 13–150)
FIBRINOGEN: 467 MG/DL (ref 238–505)
GFR NON-AFRICAN AMERICAN: >60
GFR NON-AFRICAN AMERICAN: >60
GLUCOSE BLD-MCNC: 134 MG/DL (ref 74–109)
GLUCOSE BLD-MCNC: 171 MG/DL (ref 74–109)
GLUCOSE URINE: NEGATIVE MG/DL
HCG QUALITATIVE: NEGATIVE
HCT VFR BLD CALC: 39.7 % (ref 37–47)
HCT VFR BLD CALC: 41 % (ref 37–47)
HEMOGLOBIN: 13.2 G/DL (ref 12–16)
HEMOGLOBIN: 13.4 G/DL (ref 12–16)
IMMATURE GRANULOCYTES #: 0.1 K/UL
IMMATURE GRANULOCYTES #: 0.1 K/UL
INR BLD: 1.01 (ref 0.88–1.18)
KETONES, URINE: 15 MG/DL
LACTATE DEHYDROGENASE: 234 U/L (ref 91–215)
LACTIC ACID, SEPSIS: 2.4 MG/DL (ref 0.5–1.9)
LACTIC ACID, SEPSIS: 2.4 MG/DL (ref 0.5–1.9)
LACTIC ACID: 2 MMOL/L (ref 0.5–1.9)
LEUKOCYTE ESTERASE, URINE: NEGATIVE
LIPASE: 28 U/L (ref 13–60)
LYMPHOCYTES ABSOLUTE: 0.8 K/UL (ref 1.1–4.5)
LYMPHOCYTES ABSOLUTE: 1.2 K/UL (ref 1.1–4.5)
LYMPHOCYTES RELATIVE PERCENT: 5.7 % (ref 20–40)
LYMPHOCYTES RELATIVE PERCENT: 9.2 % (ref 20–40)
Lab: NORMAL
MCH RBC QN AUTO: 27 PG (ref 27–31)
MCH RBC QN AUTO: 27.6 PG (ref 27–31)
MCHC RBC AUTO-ENTMCNC: 32.7 G/DL (ref 33–37)
MCHC RBC AUTO-ENTMCNC: 33.2 G/DL (ref 33–37)
MCV RBC AUTO: 82.7 FL (ref 81–99)
MCV RBC AUTO: 83.1 FL (ref 81–99)
MONOCYTES ABSOLUTE: 0.2 K/UL (ref 0–0.9)
MONOCYTES ABSOLUTE: 0.3 K/UL (ref 0–0.9)
MONOCYTES RELATIVE PERCENT: 1.5 % (ref 0–10)
MONOCYTES RELATIVE PERCENT: 2.5 % (ref 0–10)
NEUTROPHILS ABSOLUTE: 11.3 K/UL (ref 1.5–7.5)
NEUTROPHILS ABSOLUTE: 12.7 K/UL (ref 1.5–7.5)
NEUTROPHILS RELATIVE PERCENT: 87 % (ref 50–65)
NEUTROPHILS RELATIVE PERCENT: 92.2 % (ref 50–65)
NITRITE, URINE: NEGATIVE
OPIATE SCREEN URINE: NEGATIVE
PDW BLD-RTO: 14.6 % (ref 11.5–14.5)
PDW BLD-RTO: 14.6 % (ref 11.5–14.5)
PH UA: 7.5 (ref 5–8)
PLATELET # BLD: 349 K/UL (ref 130–400)
PLATELET # BLD: 390 K/UL (ref 130–400)
PMV BLD AUTO: 10.5 FL (ref 9.4–12.3)
PMV BLD AUTO: 10.8 FL (ref 9.4–12.3)
POTASSIUM REFLEX MAGNESIUM: 3.7 MMOL/L (ref 3.5–5)
POTASSIUM SERPL-SCNC: 3.3 MMOL/L (ref 3.5–5)
PROCALCITONIN: 0.07 NG/ML (ref 0–0.09)
PROTEIN UA: ABNORMAL MG/DL
PROTHROMBIN TIME: 13.2 SEC (ref 12–14.6)
RAPID INFLUENZA  B AGN: NEGATIVE
RAPID INFLUENZA A AGN: NEGATIVE
RBC # BLD: 4.78 M/UL (ref 4.2–5.4)
RBC # BLD: 4.96 M/UL (ref 4.2–5.4)
SARS-COV-2, PCR: NOT DETECTED
SODIUM BLD-SCNC: 135 MMOL/L (ref 136–145)
SODIUM BLD-SCNC: 139 MMOL/L (ref 136–145)
SPECIFIC GRAVITY UA: 1.02 (ref 1–1.03)
TOTAL PROTEIN: 8 G/DL (ref 6.6–8.7)
TOTAL PROTEIN: 8.6 G/DL (ref 6.6–8.7)
UROBILINOGEN, URINE: 0.2 E.U./DL
WBC # BLD: 13 K/UL (ref 4.8–10.8)
WBC # BLD: 13.8 K/UL (ref 4.8–10.8)

## 2020-07-05 PROCEDURE — 6360000004 HC RX CONTRAST MEDICATION: Performed by: PHYSICIAN ASSISTANT

## 2020-07-05 PROCEDURE — 83690 ASSAY OF LIPASE: CPT

## 2020-07-05 PROCEDURE — 84703 CHORIONIC GONADOTROPIN ASSAY: CPT

## 2020-07-05 PROCEDURE — 83615 LACTATE (LD) (LDH) ENZYME: CPT

## 2020-07-05 PROCEDURE — 82728 ASSAY OF FERRITIN: CPT

## 2020-07-05 PROCEDURE — 71045 X-RAY EXAM CHEST 1 VIEW: CPT

## 2020-07-05 PROCEDURE — 36415 COLL VENOUS BLD VENIPUNCTURE: CPT

## 2020-07-05 PROCEDURE — G0378 HOSPITAL OBSERVATION PER HR: HCPCS

## 2020-07-05 PROCEDURE — 85730 THROMBOPLASTIN TIME PARTIAL: CPT

## 2020-07-05 PROCEDURE — 6360000002 HC RX W HCPCS: Performed by: PHYSICIAN ASSISTANT

## 2020-07-05 PROCEDURE — 96374 THER/PROPH/DIAG INJ IV PUSH: CPT

## 2020-07-05 PROCEDURE — 74177 CT ABD & PELVIS W/CONTRAST: CPT

## 2020-07-05 PROCEDURE — 96372 THER/PROPH/DIAG INJ SC/IM: CPT

## 2020-07-05 PROCEDURE — 84145 PROCALCITONIN (PCT): CPT

## 2020-07-05 PROCEDURE — 82150 ASSAY OF AMYLASE: CPT

## 2020-07-05 PROCEDURE — 87804 INFLUENZA ASSAY W/OPTIC: CPT

## 2020-07-05 PROCEDURE — 80053 COMPREHEN METABOLIC PANEL: CPT

## 2020-07-05 PROCEDURE — 99285 EMERGENCY DEPT VISIT HI MDM: CPT

## 2020-07-05 PROCEDURE — 96375 TX/PRO/DX INJ NEW DRUG ADDON: CPT

## 2020-07-05 PROCEDURE — 2580000003 HC RX 258: Performed by: PHYSICIAN ASSISTANT

## 2020-07-05 PROCEDURE — 85025 COMPLETE CBC W/AUTO DIFF WBC: CPT

## 2020-07-05 PROCEDURE — 81003 URINALYSIS AUTO W/O SCOPE: CPT

## 2020-07-05 PROCEDURE — 83605 ASSAY OF LACTIC ACID: CPT

## 2020-07-05 PROCEDURE — 80307 DRUG TEST PRSMV CHEM ANLYZR: CPT

## 2020-07-05 PROCEDURE — 87040 BLOOD CULTURE FOR BACTERIA: CPT

## 2020-07-05 PROCEDURE — 85384 FIBRINOGEN ACTIVITY: CPT

## 2020-07-05 PROCEDURE — 85610 PROTHROMBIN TIME: CPT

## 2020-07-05 PROCEDURE — 85379 FIBRIN DEGRADATION QUANT: CPT

## 2020-07-05 PROCEDURE — U0003 INFECTIOUS AGENT DETECTION BY NUCLEIC ACID (DNA OR RNA); SEVERE ACUTE RESPIRATORY SYNDROME CORONAVIRUS 2 (SARS-COV-2) (CORONAVIRUS DISEASE [COVID-19]), AMPLIFIED PROBE TECHNIQUE, MAKING USE OF HIGH THROUGHPUT TECHNOLOGIES AS DESCRIBED BY CMS-2020-01-R: HCPCS

## 2020-07-05 RX ORDER — PROMETHAZINE HYDROCHLORIDE 12.5 MG/1
12.5 TABLET ORAL EVERY 6 HOURS PRN
Status: DISCONTINUED | OUTPATIENT
Start: 2020-07-05 | End: 2020-07-10 | Stop reason: HOSPADM

## 2020-07-05 RX ORDER — POTASSIUM CHLORIDE 20 MEQ/1
40 TABLET, EXTENDED RELEASE ORAL PRN
Status: DISCONTINUED | OUTPATIENT
Start: 2020-07-05 | End: 2020-07-10 | Stop reason: HOSPADM

## 2020-07-05 RX ORDER — HYDRALAZINE HYDROCHLORIDE 20 MG/ML
5 INJECTION INTRAMUSCULAR; INTRAVENOUS EVERY 6 HOURS PRN
Status: DISCONTINUED | OUTPATIENT
Start: 2020-07-05 | End: 2020-07-06

## 2020-07-05 RX ORDER — ACETAMINOPHEN 325 MG/1
650 TABLET ORAL EVERY 6 HOURS PRN
Status: DISCONTINUED | OUTPATIENT
Start: 2020-07-05 | End: 2020-07-06

## 2020-07-05 RX ORDER — ONDANSETRON 2 MG/ML
4 INJECTION INTRAMUSCULAR; INTRAVENOUS ONCE
Status: COMPLETED | OUTPATIENT
Start: 2020-07-05 | End: 2020-07-05

## 2020-07-05 RX ORDER — PROMETHAZINE HYDROCHLORIDE 25 MG/ML
25 INJECTION, SOLUTION INTRAMUSCULAR; INTRAVENOUS ONCE
Status: COMPLETED | OUTPATIENT
Start: 2020-07-05 | End: 2020-07-05

## 2020-07-05 RX ORDER — SODIUM CHLORIDE 9 MG/ML
INJECTION, SOLUTION INTRAVENOUS CONTINUOUS
Status: DISCONTINUED | OUTPATIENT
Start: 2020-07-05 | End: 2020-07-08

## 2020-07-05 RX ORDER — AMLODIPINE BESYLATE 10 MG/1
10 TABLET ORAL DAILY
Status: DISCONTINUED | OUTPATIENT
Start: 2020-07-05 | End: 2020-07-10 | Stop reason: HOSPADM

## 2020-07-05 RX ORDER — ACETAMINOPHEN 650 MG/1
650 SUPPOSITORY RECTAL EVERY 6 HOURS PRN
Status: DISCONTINUED | OUTPATIENT
Start: 2020-07-05 | End: 2020-07-06

## 2020-07-05 RX ORDER — MORPHINE SULFATE 4 MG/ML
2 INJECTION, SOLUTION INTRAMUSCULAR; INTRAVENOUS ONCE
Status: COMPLETED | OUTPATIENT
Start: 2020-07-05 | End: 2020-07-05

## 2020-07-05 RX ORDER — MAGNESIUM SULFATE IN WATER 40 MG/ML
2 INJECTION, SOLUTION INTRAVENOUS PRN
Status: DISCONTINUED | OUTPATIENT
Start: 2020-07-05 | End: 2020-07-10 | Stop reason: HOSPADM

## 2020-07-05 RX ORDER — 0.9 % SODIUM CHLORIDE 0.9 %
1000 INTRAVENOUS SOLUTION INTRAVENOUS ONCE
Status: COMPLETED | OUTPATIENT
Start: 2020-07-05 | End: 2020-07-05

## 2020-07-05 RX ORDER — ONDANSETRON 2 MG/ML
4 INJECTION INTRAMUSCULAR; INTRAVENOUS EVERY 6 HOURS PRN
Status: DISCONTINUED | OUTPATIENT
Start: 2020-07-05 | End: 2020-07-10 | Stop reason: HOSPADM

## 2020-07-05 RX ORDER — POTASSIUM CHLORIDE 7.45 MG/ML
10 INJECTION INTRAVENOUS PRN
Status: DISCONTINUED | OUTPATIENT
Start: 2020-07-05 | End: 2020-07-10 | Stop reason: HOSPADM

## 2020-07-05 RX ORDER — SODIUM CHLORIDE 0.9 % (FLUSH) 0.9 %
10 SYRINGE (ML) INJECTION EVERY 12 HOURS SCHEDULED
Status: DISCONTINUED | OUTPATIENT
Start: 2020-07-05 | End: 2020-07-10 | Stop reason: HOSPADM

## 2020-07-05 RX ORDER — POLYETHYLENE GLYCOL 3350 17 G/17G
17 POWDER, FOR SOLUTION ORAL DAILY PRN
Status: DISCONTINUED | OUTPATIENT
Start: 2020-07-05 | End: 2020-07-10 | Stop reason: HOSPADM

## 2020-07-05 RX ORDER — SODIUM CHLORIDE 0.9 % (FLUSH) 0.9 %
10 SYRINGE (ML) INJECTION PRN
Status: DISCONTINUED | OUTPATIENT
Start: 2020-07-05 | End: 2020-07-10 | Stop reason: HOSPADM

## 2020-07-05 RX ADMIN — ONDANSETRON 4 MG: 2 INJECTION INTRAMUSCULAR; INTRAVENOUS at 17:36

## 2020-07-05 RX ADMIN — ENOXAPARIN SODIUM 30 MG: 30 INJECTION SUBCUTANEOUS at 21:11

## 2020-07-05 RX ADMIN — HYDRALAZINE HYDROCHLORIDE 5 MG: 20 INJECTION INTRAMUSCULAR; INTRAVENOUS at 21:11

## 2020-07-05 RX ADMIN — PROMETHAZINE HYDROCHLORIDE 25 MG: 25 INJECTION INTRAMUSCULAR; INTRAVENOUS at 14:25

## 2020-07-05 RX ADMIN — IOPAMIDOL 90 ML: 755 INJECTION, SOLUTION INTRAVENOUS at 18:15

## 2020-07-05 RX ADMIN — SODIUM CHLORIDE: 9 INJECTION, SOLUTION INTRAVENOUS at 21:11

## 2020-07-05 RX ADMIN — MORPHINE SULFATE 4 MG: 4 INJECTION INTRAVENOUS at 17:36

## 2020-07-05 RX ADMIN — SODIUM CHLORIDE 1000 ML: 9 INJECTION, SOLUTION INTRAVENOUS at 14:26

## 2020-07-05 RX ADMIN — Medication 10 ML: at 21:11

## 2020-07-05 ASSESSMENT — PAIN SCALES - GENERAL
PAINLEVEL_OUTOF10: 3
PAINLEVEL_OUTOF10: 7

## 2020-07-05 ASSESSMENT — ENCOUNTER SYMPTOMS
APNEA: 0
EYE PAIN: 0
RHINORRHEA: 0
EYE DISCHARGE: 0
SORE THROAT: 0
DIARRHEA: 1
COUGH: 0
ABDOMINAL PAIN: 1
COLOR CHANGE: 0
BLOOD IN STOOL: 0
SHORTNESS OF BREATH: 1
ABDOMINAL DISTENTION: 0
VOMITING: 1
NAUSEA: 1
BACK PAIN: 0
PHOTOPHOBIA: 0

## 2020-07-05 ASSESSMENT — PAIN DESCRIPTION - LOCATION: LOCATION: ABDOMEN

## 2020-07-05 ASSESSMENT — PAIN DESCRIPTION - PAIN TYPE: TYPE: ACUTE PAIN

## 2020-07-05 NOTE — LETTER
[facility]  [address] [facility]  Phone: [phone]             July 10, 2020    Patient: Marek Whitman   YOB: 1985   Date of Visit: 7/5/2020       To Whom It May Concern:    Marek Whitman was seen and treated in our facility  beginning 7/5/2020 until 7/10/20 . She may return to work on 7/13/20.       Sincerely,       Liliam Espinoza RN         Signature:__________________________________

## 2020-07-06 ENCOUNTER — APPOINTMENT (OUTPATIENT)
Dept: CT IMAGING | Age: 35
DRG: 392 | End: 2020-07-06
Payer: COMMERCIAL

## 2020-07-06 LAB
ALBUMIN SERPL-MCNC: 4.3 G/DL (ref 3.5–5.2)
ALP BLD-CCNC: 107 U/L (ref 35–104)
ALT SERPL-CCNC: 22 U/L (ref 5–33)
ANION GAP SERPL CALCULATED.3IONS-SCNC: 15 MMOL/L (ref 7–19)
AST SERPL-CCNC: 27 U/L (ref 5–32)
BILIRUB SERPL-MCNC: <0.2 MG/DL (ref 0.2–1.2)
BUN BLDV-MCNC: 8 MG/DL (ref 6–20)
CALCIUM SERPL-MCNC: 8.8 MG/DL (ref 8.6–10)
CHLORIDE BLD-SCNC: 100 MMOL/L (ref 98–111)
CO2: 20 MMOL/L (ref 22–29)
CREAT SERPL-MCNC: <0.5 MG/DL (ref 0.5–0.9)
GFR NON-AFRICAN AMERICAN: >60
GLUCOSE BLD-MCNC: 112 MG/DL (ref 74–109)
LACTIC ACID: 1.3 MMOL/L (ref 0.5–1.9)
POTASSIUM REFLEX MAGNESIUM: 4.1 MMOL/L (ref 3.5–5)
SARS-COV-2, PCR: NOT DETECTED
SODIUM BLD-SCNC: 135 MMOL/L (ref 136–145)
TOTAL PROTEIN: 7.3 G/DL (ref 6.6–8.7)

## 2020-07-06 PROCEDURE — 96375 TX/PRO/DX INJ NEW DRUG ADDON: CPT

## 2020-07-06 PROCEDURE — 6360000002 HC RX W HCPCS: Performed by: INTERNAL MEDICINE

## 2020-07-06 PROCEDURE — 6370000000 HC RX 637 (ALT 250 FOR IP): Performed by: PHYSICIAN ASSISTANT

## 2020-07-06 PROCEDURE — 6360000002 HC RX W HCPCS: Performed by: PHYSICIAN ASSISTANT

## 2020-07-06 PROCEDURE — 80053 COMPREHEN METABOLIC PANEL: CPT

## 2020-07-06 PROCEDURE — G0378 HOSPITAL OBSERVATION PER HR: HCPCS

## 2020-07-06 PROCEDURE — 2580000003 HC RX 258: Performed by: PHYSICIAN ASSISTANT

## 2020-07-06 PROCEDURE — 96376 TX/PRO/DX INJ SAME DRUG ADON: CPT

## 2020-07-06 PROCEDURE — 6370000000 HC RX 637 (ALT 250 FOR IP): Performed by: HOSPITALIST

## 2020-07-06 PROCEDURE — 96372 THER/PROPH/DIAG INJ SC/IM: CPT

## 2020-07-06 PROCEDURE — 2500000003 HC RX 250 WO HCPCS: Performed by: PHYSICIAN ASSISTANT

## 2020-07-06 PROCEDURE — 96367 TX/PROPH/DG ADDL SEQ IV INF: CPT

## 2020-07-06 PROCEDURE — 6360000002 HC RX W HCPCS: Performed by: HOSPITALIST

## 2020-07-06 PROCEDURE — 96366 THER/PROPH/DIAG IV INF ADDON: CPT

## 2020-07-06 PROCEDURE — 96365 THER/PROPH/DIAG IV INF INIT: CPT

## 2020-07-06 PROCEDURE — 6360000004 HC RX CONTRAST MEDICATION: Performed by: INTERNAL MEDICINE

## 2020-07-06 PROCEDURE — 83605 ASSAY OF LACTIC ACID: CPT

## 2020-07-06 PROCEDURE — 71275 CT ANGIOGRAPHY CHEST: CPT

## 2020-07-06 RX ORDER — MORPHINE SULFATE 4 MG/ML
1 INJECTION, SOLUTION INTRAMUSCULAR; INTRAVENOUS ONCE
Status: COMPLETED | OUTPATIENT
Start: 2020-07-06 | End: 2020-07-06

## 2020-07-06 RX ORDER — HYDRALAZINE HYDROCHLORIDE 20 MG/ML
10 INJECTION INTRAMUSCULAR; INTRAVENOUS EVERY 6 HOURS PRN
Status: DISCONTINUED | OUTPATIENT
Start: 2020-07-06 | End: 2020-07-10 | Stop reason: HOSPADM

## 2020-07-06 RX ORDER — KETOROLAC TROMETHAMINE 30 MG/ML
30 INJECTION, SOLUTION INTRAMUSCULAR; INTRAVENOUS EVERY 6 HOURS PRN
Status: DISCONTINUED | OUTPATIENT
Start: 2020-07-06 | End: 2020-07-10 | Stop reason: HOSPADM

## 2020-07-06 RX ORDER — CIPROFLOXACIN 2 MG/ML
400 INJECTION, SOLUTION INTRAVENOUS EVERY 12 HOURS
Status: DISCONTINUED | OUTPATIENT
Start: 2020-07-06 | End: 2020-07-10

## 2020-07-06 RX ORDER — KETOROLAC TROMETHAMINE 30 MG/ML
30 INJECTION, SOLUTION INTRAMUSCULAR; INTRAVENOUS
Status: COMPLETED | OUTPATIENT
Start: 2020-07-06 | End: 2020-07-06

## 2020-07-06 RX ORDER — ACETAMINOPHEN 325 MG/1
650 TABLET ORAL EVERY 6 HOURS PRN
Status: DISCONTINUED | OUTPATIENT
Start: 2020-07-06 | End: 2020-07-10 | Stop reason: HOSPADM

## 2020-07-06 RX ADMIN — KETOROLAC TROMETHAMINE 30 MG: 30 INJECTION, SOLUTION INTRAMUSCULAR at 14:52

## 2020-07-06 RX ADMIN — KETOROLAC TROMETHAMINE 30 MG: 30 INJECTION, SOLUTION INTRAMUSCULAR at 04:41

## 2020-07-06 RX ADMIN — ONDANSETRON 4 MG: 2 INJECTION INTRAMUSCULAR; INTRAVENOUS at 21:20

## 2020-07-06 RX ADMIN — METRONIDAZOLE 500 MG: 500 INJECTION, SOLUTION INTRAVENOUS at 10:55

## 2020-07-06 RX ADMIN — Medication 10 ML: at 21:24

## 2020-07-06 RX ADMIN — HYDRALAZINE HYDROCHLORIDE 5 MG: 20 INJECTION INTRAMUSCULAR; INTRAVENOUS at 15:35

## 2020-07-06 RX ADMIN — PROMETHAZINE HYDROCHLORIDE 12.5 MG: 12.5 TABLET ORAL at 19:43

## 2020-07-06 RX ADMIN — ONDANSETRON 4 MG: 2 INJECTION INTRAMUSCULAR; INTRAVENOUS at 08:16

## 2020-07-06 RX ADMIN — ENOXAPARIN SODIUM 30 MG: 30 INJECTION SUBCUTANEOUS at 08:16

## 2020-07-06 RX ADMIN — HYDRALAZINE HYDROCHLORIDE 10 MG: 20 INJECTION INTRAMUSCULAR; INTRAVENOUS at 19:49

## 2020-07-06 RX ADMIN — KETOROLAC TROMETHAMINE 30 MG: 30 INJECTION, SOLUTION INTRAMUSCULAR at 21:21

## 2020-07-06 RX ADMIN — CIPROFLOXACIN 400 MG: 2 INJECTION, SOLUTION INTRAVENOUS at 12:01

## 2020-07-06 RX ADMIN — IOPAMIDOL 90 ML: 755 INJECTION, SOLUTION INTRAVENOUS at 17:09

## 2020-07-06 RX ADMIN — CIPROFLOXACIN 400 MG: 2 INJECTION, SOLUTION INTRAVENOUS at 22:02

## 2020-07-06 RX ADMIN — ONDANSETRON 4 MG: 2 INJECTION INTRAMUSCULAR; INTRAVENOUS at 14:52

## 2020-07-06 RX ADMIN — ACETAMINOPHEN 650 MG: 325 TABLET, FILM COATED ORAL at 21:21

## 2020-07-06 RX ADMIN — MORPHINE SULFATE 1 MG: 4 INJECTION INTRAVENOUS at 03:59

## 2020-07-06 RX ADMIN — AMLODIPINE BESYLATE 10 MG: 10 TABLET ORAL at 08:15

## 2020-07-06 RX ADMIN — METRONIDAZOLE 500 MG: 500 INJECTION, SOLUTION INTRAVENOUS at 18:00

## 2020-07-06 RX ADMIN — Medication 10 ML: at 08:17

## 2020-07-06 RX ADMIN — PROMETHAZINE HYDROCHLORIDE 12.5 MG: 12.5 TABLET ORAL at 10:55

## 2020-07-06 RX ADMIN — ONDANSETRON 4 MG: 2 INJECTION INTRAMUSCULAR; INTRAVENOUS at 02:45

## 2020-07-06 RX ADMIN — HYDRALAZINE HYDROCHLORIDE 5 MG: 20 INJECTION INTRAMUSCULAR; INTRAVENOUS at 08:16

## 2020-07-06 RX ADMIN — METOPROLOL TARTRATE 25 MG: 25 TABLET, FILM COATED ORAL at 10:55

## 2020-07-06 RX ADMIN — METOPROLOL TARTRATE 25 MG: 25 TABLET, FILM COATED ORAL at 21:21

## 2020-07-06 ASSESSMENT — ENCOUNTER SYMPTOMS
DIARRHEA: 0
PHOTOPHOBIA: 0
COUGH: 0
NAUSEA: 1
VOMITING: 1
TROUBLE SWALLOWING: 0
SHORTNESS OF BREATH: 0
CONSTIPATION: 0
SINUS PAIN: 0
SORE THROAT: 0
ABDOMINAL PAIN: 0
WHEEZING: 0
STRIDOR: 0
ABDOMINAL DISTENTION: 0

## 2020-07-06 ASSESSMENT — PAIN DESCRIPTION - PAIN TYPE
TYPE: CHRONIC PAIN
TYPE: CHRONIC PAIN

## 2020-07-06 ASSESSMENT — PAIN DESCRIPTION - DESCRIPTORS
DESCRIPTORS: THROBBING;TIGHTNESS
DESCRIPTORS: SQUEEZING;TIGHTNESS

## 2020-07-06 ASSESSMENT — PAIN SCALES - GENERAL
PAINLEVEL_OUTOF10: 7
PAINLEVEL_OUTOF10: 0
PAINLEVEL_OUTOF10: 7
PAINLEVEL_OUTOF10: 0
PAINLEVEL_OUTOF10: 0
PAINLEVEL_OUTOF10: 6
PAINLEVEL_OUTOF10: 7
PAINLEVEL_OUTOF10: 0
PAINLEVEL_OUTOF10: 6

## 2020-07-06 ASSESSMENT — PAIN - FUNCTIONAL ASSESSMENT
PAIN_FUNCTIONAL_ASSESSMENT: PREVENTS OR INTERFERES SOME ACTIVE ACTIVITIES AND ADLS
PAIN_FUNCTIONAL_ASSESSMENT: PREVENTS OR INTERFERES SOME ACTIVE ACTIVITIES AND ADLS

## 2020-07-06 ASSESSMENT — PAIN DESCRIPTION - ORIENTATION: ORIENTATION: LOWER

## 2020-07-06 ASSESSMENT — PAIN DESCRIPTION - FREQUENCY
FREQUENCY: CONTINUOUS
FREQUENCY: CONTINUOUS

## 2020-07-06 ASSESSMENT — PAIN DESCRIPTION - PROGRESSION
CLINICAL_PROGRESSION: NOT CHANGED
CLINICAL_PROGRESSION: GRADUALLY WORSENING

## 2020-07-06 ASSESSMENT — PAIN DESCRIPTION - LOCATION
LOCATION: BACK;NECK
LOCATION: NECK

## 2020-07-06 ASSESSMENT — PAIN DESCRIPTION - ONSET
ONSET: PROGRESSIVE
ONSET: ON-GOING

## 2020-07-06 NOTE — PROGRESS NOTES
Per pt she has had NO KNOWN EXPOSURE to covid-19. States her 4yo daughter was coughing and sneezing today but had negative covid test in ED. Only other exposure to illness is a small child (cousin) who has had similar GI symptoms as pt. Was reported in ED that pt has been SOB, however, pt states she became SOB walking outside today \"because of the heat and humidity. \" Electronically signed by Sharon Crawford RN on 7/5/2020 at 8:53 PM

## 2020-07-06 NOTE — PROGRESS NOTES
Per jatin Mendosa to do in house PCR covid testing as opposed to actual sendout test. See nursing communication orders.  Electronically signed by Jaden Sharp RN on 7/5/2020 at 10:03 PM

## 2020-07-06 NOTE — PROGRESS NOTES
[facility]      Patient:  Swapnil Ennis  YOB: 1985  Date of Service: 7/6/2020  MRN: [id number]   Acct: [de-identified]   Primary Care Physician: Heather Oh MD  Advance Directive: Full Code  Admit Date: 7/5/2020       Hospital Day: 0    CHIEF COMPLAINT: Concern for COVID-19    SUBJECTIVE:     Pt COVID PCR negative. She is moved to the floor. Pt is somnolent. Pt continues to have nausea and vomiting. No diarrhea today. She is tachycardic and diaphoretic. CTA negative for PE. Cumulative hospital course:   7/05/2020 the patient is a 72-year-old female with past medical history of hypertension depression who presented to [facility] ED complaining of fever body aches chills nonproductive cough chest congestion with nausea and vomiting. Patient admits to avoid exposure and states daughter has same symptoms as she is experiencing now. In ED patient is hypertensive labs show elevated lactic acid, hypokalemia, leukocytosis. Chest x-ray unremarkable. CT abdomen pelvis reveals nonspecific colitis. Rapid COVID negative. COVID send out as patient admits to exposure. 7/06/2020 patient more hypertensive today with some tachycardia. Patient started on Cipro/Flagyl and Lopressor 25 twice daily. Stool cultures ordered. Awaiting COVID send out results. BP improving. Temp 100.1 with intermittent tachycardia. Review of Systems:   Review of Systems   Constitutional: Negative for chills, diaphoresis, fatigue and fever. HENT: Negative for congestion, ear pain, sinus pain, sore throat and trouble swallowing. Eyes: Negative for photophobia and visual disturbance. Respiratory: Negative for cough, shortness of breath, wheezing and stridor. Cardiovascular: Negative for chest pain, palpitations and leg swelling. Gastrointestinal: Positive for nausea and vomiting. Negative for abdominal distention, abdominal pain, constipation and diarrhea.    Endocrine: Negative for cold intolerance and heat intolerance. Genitourinary: Negative for difficulty urinating, flank pain, frequency and urgency. Musculoskeletal: Negative for arthralgias and myalgias. Neurological: Negative for dizziness, syncope, weakness, light-headedness, numbness and headaches. Hematological: Does not bruise/bleed easily. Psychiatric/Behavioral: Negative for agitation, confusion and dysphoric mood. Objective:   VITALS:  BP (!) 155/95   Pulse 99   Temp 97.5 °F (36.4 °C) (Temporal)   Resp 26   Ht 5' 9\" (1.753 m)   Wt 148 lb 11.2 oz (67.4 kg)   SpO2 100%   BMI 21.96 kg/m²   24HR INTAKE/OUTPUT:      Intake/Output Summary (Last 24 hours) at 7/6/2020 1222  Last data filed at 7/6/2020 0600  Gross per 24 hour   Intake 1102.08 ml   Output 400 ml   Net 702.08 ml       Physical Exam  Constitutional:       General: She is not in acute distress. Appearance: She is ill-appearing and diaphoretic. She is not toxic-appearing. HENT:      Head: Normocephalic and atraumatic. Right Ear: External ear normal.      Left Ear: External ear normal.      Nose: Nose normal. No congestion or rhinorrhea. Mouth/Throat:      Mouth: Mucous membranes are moist.      Pharynx: Oropharynx is clear. Eyes:      General: No scleral icterus. Extraocular Movements: Extraocular movements intact. Conjunctiva/sclera: Conjunctivae normal.   Neck:      Musculoskeletal: Normal range of motion and neck supple. Cardiovascular:      Rate and Rhythm: Regular rhythm. Tachycardia present. Pulses: Normal pulses. Heart sounds: Normal heart sounds. No murmur. No friction rub. No gallop. Pulmonary:      Effort: Pulmonary effort is normal. No respiratory distress. Breath sounds: Normal breath sounds. No stridor. No wheezing, rhonchi or rales. Abdominal:      General: Abdomen is flat. Bowel sounds are normal. There is no distension. Palpations: Abdomen is soft. Tenderness: There is no abdominal tenderness. Musculoskeletal: Normal range of motion. General: No swelling. Right lower leg: No edema. Left lower leg: No edema. Skin:     General: Skin is warm. Coloration: Skin is not jaundiced. Findings: No erythema, lesion or rash. Neurological:      General: No focal deficit present. Mental Status: She is oriented to person, place, and time. Mental status is at baseline. Cranial Nerves: No cranial nerve deficit. Sensory: No sensory deficit. Motor: No weakness. Pt is resting in bed in no acute distress.      Medications:      sodium chloride 125 mL/hr at 07/05/20 2111      metoprolol tartrate  25 mg Oral BID    ciprofloxacin  400 mg Intravenous Q12H    metroNIDAZOLE  500 mg Intravenous Q8H    amLODIPine  10 mg Oral Daily    sertraline  50 mg Oral Daily    enoxaparin  30 mg Subcutaneous BID    sodium chloride flush  10 mL Intravenous 2 times per day     acetaminophen **OR** acetaminophen, sodium chloride flush, polyethylene glycol, promethazine **OR** ondansetron, potassium chloride **OR** potassium alternative oral replacement **OR** potassium chloride, magnesium sulfate, hydrALAZINE  DIET CLEAR LIQUID;     Lab and other Data:     Recent Labs     07/05/20  1305 07/05/20 2145   WBC 13.0* 13.8*   HGB 13.4 13.2    349     Recent Labs     07/05/20  1305 07/05/20 2145 07/06/20  0510    135* 135*   K 3.3* 3.7 4.1   CL 97* 94* 100   CO2 21* 22 20*   BUN 9 7 8   CREATININE 0.6 0.6 <0.5   GLUCOSE 171* 134* 112*     Recent Labs     07/05/20  1305 07/05/20 2145 07/06/20  0510   AST 32 30 27   ALT 24 26 22   BILITOT 0.4 0.3 <0.2   ALKPHOS 118* 122* 107*     INR:   Recent Labs     07/05/20 2145   INR 1.01     UA:  Recent Labs     07/05/20  1710   COLORU YELLOW   PHUR 7.5   CLARITYU Clear   SPECGRAV 1.022   LEUKOCYTESUR Negative   UROBILINOGEN 0.2   BILIRUBINUR Negative   BLOODU Negative   GLUCOSEU Negative       RAD:   Ct Abdomen Pelvis W Iv Contrast Additional Contrast? None  Result Date: 7/5/2020  1. No acute inflammatory process seen in the abdomen or pelvis. Wall thickening of the colon may be artifactual due to underdistention. Nonspecific colitis considered. No bowel obstruction. No appendicitis. No free air or abscess. 2. Subcentimeter hypodense lesions within the superior liver likely incidental cysts. Subcentimeter superior left renal cyst. Distended bladder. Signed by Dr Itz Walsh on 7/5/2020 6:56 PM    Xr Chest Portable  Result Date: 7/5/2020  Findings: The lungs are well expanded and clear. The cardiomediastinal contours are normal. There is no pleural effusion or pneumothorax. No acute bony pathology is identified. 1. No acute radiographic cardiopulmonary process. Signed by Dr Itz Walsh on 7/5/2020 5:59 PM      Micro:  Culture, Stool [[id number]]    Order Status: No result Specimen: Stool      Culture, Blood 1 [[id number]] Collected: 07/05/20 2145   Order Status: Sent Specimen: Blood Updated: 07/05/20 2159   Rapid influenza A/B antigens [[id number]] Collected: 07/05/20 1425   Order Status: Completed Specimen: Nasopharyngeal Updated: 07/05/20 1454    Rapid Influenza A Ag Negative    Rapid Influenza B Ag Negative           Assessment/Plan   Principal Problem:    Gastroenteritis- pt with tachycardia, tachypnia, hypertension, elevated lactic acid, Leukocytosis. continue IVF's, antiemetics, clear liquids for now. Cipro/Flagyl added and stool cultures ordered for completeness.      Active Problems:   Lactic Acidosis- 2.4, repeat ordered. Blood culture pending. Continue IVF and monitor.        Hypertensive disorder-Norvasc resumed, Lopressor 25 BID added.       Close exposure to COVID-19 virus- Covid rapid and PCR negative       Hypokalemia- resolved      DVT prophylaxis- Lovenox 30    Charles Lora PA-C  7/6/2020, 12:22 PM

## 2020-07-06 NOTE — ED PROVIDER NOTES
behavioral problems and confusion. Except as noted above the remainder of the review of systems was reviewed and negative.        PAST MEDICAL HISTORY     Past Medical History:   Diagnosis Date    Depression 2019    Hypertensive disorder 2019         SURGICAL HISTORY       Past Surgical History:   Procedure Laterality Date     SECTION           CURRENT MEDICATIONS       Previous Medications    AMLODIPINE (NORVASC) 10 MG TABLET    Take 1 tablet by mouth daily    ONDANSETRON (ZOFRAN) 4 MG TABLET    Take 4 mg by mouth every 8 hours as needed for Nausea or Vomiting    PANTOPRAZOLE (PROTONIX) 40 MG TABLET    Take 1 tablet by mouth every morning (before breakfast) for 14 days    SERTRALINE (ZOLOFT) 50 MG TABLET    Take 1 tablet by mouth daily       ALLERGIES     Bee venom    FAMILY HISTORY       Family History   Problem Relation Age of Onset    Hypertension Paternal Grandfather     Diabetes Paternal Grandfather     Cancer Paternal Grandmother     Hypertension Paternal Grandmother     Diabetes Paternal Grandmother     Diabetes Maternal Grandmother     Hypertension Maternal Grandmother     Hypertension Maternal Grandfather     Diabetes Maternal Grandfather     Diabetes Father     Eclampsia Father     Diabetes Mother     Eclampsia Mother           SOCIAL HISTORY       Social History     Socioeconomic History    Marital status: Single     Spouse name: None    Number of children: None    Years of education: None    Highest education level: None   Occupational History    None   Social Needs    Financial resource strain: None    Food insecurity     Worry: None     Inability: None    Transportation needs     Medical: None     Non-medical: None   Tobacco Use    Smoking status: Former Smoker    Smokeless tobacco: Never Used   Substance and Sexual Activity    Alcohol use: No    Drug use: No    Sexual activity: Never   Lifestyle    Physical activity     Days per week: None stridor. No wheezing. Abdominal:      General: Bowel sounds are normal. There is no distension. Palpations: Abdomen is soft. Tenderness: There is abdominal tenderness. There is no guarding. Musculoskeletal: Normal range of motion. Skin:     General: Skin is warm and dry. Capillary Refill: Capillary refill takes less than 2 seconds. Findings: No rash. Neurological:      Mental Status: She is alert and oriented to person, place, and time. Cranial Nerves: No cranial nerve deficit. Sensory: No sensory deficit. Coordination: Coordination normal.   Psychiatric:         Mood and Affect: Mood normal.         Behavior: Behavior normal.         Thought Content: Thought content normal.         Judgment: Judgment normal.           DIAGNOSTIC RESULTS     RADIOLOGY:   Non-plain film images such as CT, Ultrasound and MRI are read by the radiologist. Plain radiographic images are visualized and preliminarilyinterpreted by Delia Doty MD with the below findings:      Interpretation per the Radiologist below, if available at the time of this note:    CT ABDOMEN PELVIS W IV CONTRAST Additional Contrast? None   Final Result   1. No acute inflammatory process seen in the abdomen or pelvis. Wall   thickening of the colon may be artifactual due to underdistention. Nonspecific colitis considered. No bowel obstruction. No appendicitis. No free air or abscess. 2. Subcentimeter hypodense lesions within the superior liver likely   incidental cysts. Subcentimeter superior left renal cyst. Distended   bladder. Signed by Dr Donna Duke on 7/5/2020 6:56 PM      XR CHEST PORTABLE   Final Result   1. No acute radiographic cardiopulmonary process.    Signed by Dr Donna Duke on 7/5/2020 5:59 PM          LABS:  Labs Reviewed   URINE RT REFLEX TO CULTURE - Abnormal; Notable for the following components:       Result Value    Ketones, Urine 15 (*)     Protein, UA TRACE (*)     All other rather unremarkable looks more like a gastroenteritis than anything the colitis, and is forwarded to the hospitalist who will watch her with continued fluids nausea meds as needed and monitoring a decrease in lactic. There is an obvious dehydration finding plan for admission with Dr. Anne Watkins. PROCEDURES:    Procedures      FINAL IMPRESSION      1. Intractable nausea and vomiting    2. Generalized abdominal pain          DISPOSITION/PLAN   DISPOSITION Decision To Admit 07/05/2020 07:09:39 PM      PATIENT REFERRED TO:  No follow-up provider specified.     DISCHARGE MEDICATIONS:  New Prescriptions    No medications on file       (Please note that portions of this note were completed with a voice recognition program.  Efforts were made to edit the dictations but occasionallywords are mis-transcribed.)    Sharif Arita [address], [place]  07/05/20 1931

## 2020-07-06 NOTE — PROGRESS NOTES
4 Eyes Skin Assessment    Mónica Sherman is being assessed upon: Transfer to [facility]    I agree that Roscoe Dey, along with Irving Carmona (either 2 RN's or 1 LPN and 1 RN) have performed a thorough Head to Toe Skin Assessment on the patient. ALL assessment sites listed below have been assessed. Areas assessed by both nurses:     [x]   Head, Face, and Ears   [x]   Shoulders, Back, and Chest  [x]   Arms, Elbows, and Hands   [x]   Coccyx, Sacrum, and Ischium  [x]   Legs, Feet, and Heels    Does the Patient have Skin Breakdown?  No    Satinder Prevention initiated: No  Wound Care Orders initiated: No    WOC nurse consulted for Pressure Injury (Stage 3,4, Unstageable, DTI, NWPT, and Complex wounds) and New or Established Ostomies: No        Primary Nurse eSignature: Babatunde Lara RN on 7/6/2020 at 6:52 PM      Co-Signer eSignature: {Esignature:[id number]}

## 2020-07-06 NOTE — PROGRESS NOTES
Pt transported off unit to CT scan at [address] wheel chair accompanied by Transport. Pt will be taken from CT scan to 3rd floor.

## 2020-07-06 NOTE — PROGRESS NOTES
Marek Whitman received from [facility]cu to room # 334 . Mental Status: Patient is oriented, alert, coherent, logical, thought processes intact and able to concentrate and follow conversation. Vitals:    07/06/20 1745   BP: (!) 154/90   Pulse:    Resp:    Temp:    SpO2:      Placed on cardiac monitor: No.  Belongings: None with patient at bedside . Family at bedside No.  Oriented Patient to room. Call light within reach. Yes. Transfer was: Well tolerated by patient. .    Electronically signed by Vasu Abel RN on 7/6/2020 at 6:50 PM

## 2020-07-06 NOTE — PROGRESS NOTES
Attempted x 2 to complete ACP note with pt. Asleep, then on the phone. PC  will attempt this afternoon if unable will follow up in the morning.   Electronically signed by Macy Akbar RN on 7/6/2020 at 2:39 PM

## 2020-07-06 NOTE — PROGRESS NOTES
Jemal Nolan transferred to [phone] from [phone] via wheelchair. Reason for transfer: lower level of are   Explained reason for transfer to Patient. Belongings: phone, under gament, other electronins monitoring device with patient at bedside . Soft chart transferred with patient: Yes. Telemetry box number N/A transferred with patient: N/A. Report given to: Spencer Parker RN, via telephone.       Electronically signed by Barb Poon RN on 7/6/2020 at 5:35 PM

## 2020-07-06 NOTE — PLAN OF CARE
Problem: Falls - Risk of:  Goal: Will remain free from falls  Description: Will remain free from falls  Outcome: Ongoing  Goal: Absence of physical injury  Description: Absence of physical injury  Outcome: Ongoing     Electronically signed by Mary Ann Brice RN on 7/6/2020 at 2:07 AM

## 2020-07-06 NOTE — H&P
[facility] Hospitalists  Hospitalist - History & Physical      PCP: Marissa Fierro MD    Date of Admission: 2020    Date of Service: 2020    Chief Complaint:  Concern for COVID-19    History Of Present Illness: The patient is a 37 y.o. female with PMH HTN, depression who presented to [facility] ED complaining of fever, body aches, chills, non-productive cough, chest congestion with nausea and vomiting. She states her daughter has the same symptoms and states she has been exposed to a family member positive with COVID-19. Work up in ED reveals hypertension, elevated lactic acid, hypokalemia, leukocytosis. CXR unremarkable. CT abdomen/pelvis with nonspecific colitis. Past Medical History:        Diagnosis Date    Depression 2019    Hypertensive disorder 2019     Past Surgical History:        Procedure Laterality Date     SECTION       Home Medications:  Prior to Admission medications    Medication Sig Start Date End Date Taking? Authorizing Provider   sertraline (ZOLOFT) 50 MG tablet Take 1 tablet by mouth daily 19  Yes Brian See MD   amLODIPine (NORVASC) 10 MG tablet Take 1 tablet by mouth daily 19  Yes Brian See MD   ondansetron (ZOFRAN) 4 MG tablet Take 4 mg by mouth every 8 hours as needed for Nausea or Vomiting   Yes Historical Provider, MD   pantoprazole (PROTONIX) 40 MG tablet Take 1 tablet by mouth every morning (before breakfast) for 14 days 19  Brian See MD     Allergies:    Bee venom    Social History:    The patient currently lives at home. Tobacco:   reports that she has quit smoking. She has never used smokeless tobacco.  Alcohol:   reports no history of alcohol use.   Illicit Drugs: denies    Family History:      Problem Relation Age of Onset    Hypertension Paternal Grandfather     Diabetes Paternal Grandfather     Cancer Paternal Grandmother     Hypertension Paternal Grandmother     Diabetes Paternal Grandmother     Diabetes Maternal Grandmother     Hypertension Maternal Grandmother     Hypertension Maternal Grandfather     Diabetes Maternal Grandfather    Karyn Francois Diabetes Father    Karyn Francois Eclampsia Father     Diabetes Mother     Eclampsia Mother      Review of Systems:   Constitutional / general:  + fever /+ chills / +sweats  Head:  Denies headache / neck stiffness / trauma / visual change  Eyes:  Denies blurry vision / acute visual change or loss / itching / redness  ENT: + sore throat /Denies hoarseness / +nasal drainage /Denies ear pain  CV:  Denies chest pain / palpitations/ orthopnea   Respiratory:  +cough /+ shortness of breath / Denies sputum / hemoptysis  GI: + nausea / +vomiting / + abdominal pain /Denies diarrhea / constipation  :  Denies dysuria / hesitancy / urgency / hematuria   Neuro: Denies paralysis / syncope / seizure / dysphagia / headache / paresthesias  Musculoskeletal:  Denies muscle weakness /joint stiffness / pain  Vascular: Denies edema / claudication / varicosities  Heme / endocrine: Denies easy bruising / bleeding / excessive sweating / heat or cold intolerance  Psychiatric:  Denies depression / anxiety / insomnia / mood changes  Skin:  Denies new rashes / lesions / skin hair or nail changes    14 point review of systems is negative except as specifically addressed above. Physical Examination:  BP (!) 149/100   Pulse 98   Temp 98.6 °F (37 °C) (Oral)   Resp 16   Ht 5' 9\" (1.753 m)   Wt 150 lb (68 kg)   SpO2 98%   BMI 22.15 kg/m²   General appearance: 36 yo female, cachectic, no acute distress  Head: Normocephalic, without obvious abnormality, atraumatic  Eyes: conjunctivae injected/corneas clear. PERRL, EOM's intact.    Ears: normal external ears and nose, throat without exudate  Neck: no adenopathy, no carotid bruit, no JVD, supple, symmetrical, trachea midline and thyroid not enlarged, symmetric, no tenderness/mass/nodules  Lungs: coarse air exchange otherwise clear to auscultation bilaterally, no rales or wheezes   Heart: regular rate and rhythm, S1, S2 normal, no murmur  Abdomen:soft, non-tender; non-distended, normal bowel sounds no masses, no organomegaly  Extremities:No lower extremity edema,  No erythema, no tenderness to palpation  Skin: Skin color, texture, turgor normal. No rashes or lesions  Lymphatic: No palpable lymph node enlargment  Neurologic: Lethargic, oriented X 3, normal strength and tone. Normal symmetric reflexes. Mental status: Alert, oriented, thought content appropriate  Cranial nerves:II-XII Grossly intact Sensory: normal Motor:grossly normal  Psychiatric: Flat affect, answers questions appropriately    Diagnostic Data:  CBC:  Recent Labs     07/05/20  1305   WBC 13.0*   HGB 13.4   HCT 41.0        BMP:  Recent Labs     07/05/20  1305      K 3.3*   CL 97*   CO2 21*   BUN 9   CREATININE 0.6   CALCIUM 9.4     Recent Labs     07/05/20  1305   AST 32   ALT 24   BILITOT 0.4   ALKPHOS 118*     Ct Abdomen Pelvis W Iv Contrast Additional Contrast? None  1. No acute inflammatory process seen in the abdomen or pelvis. Wall thickening of the colon may be artifactual due to underdistention. Nonspecific colitis considered. No bowel obstruction. No appendicitis. No free air or abscess. 2. Subcentimeter hypodense lesions within the superior liver likely incidental cysts. Subcentimeter superior left renal cyst. Distended bladder. Signed by Dr Ha Kuhn on 7/5/2020 6:56 PM    Xr Chest Portable  1. No acute radiographic cardiopulmonary process.  Signed by Dr Ha Kuhn on 7/5/2020 5:59 PM    Assessment/Plan:  Principal Problem:    Gastroenteritis-suspect viral in nature, continue symptomatic care with IVF's, antiemetics, clear liquids for now    Active Problems:    Hypertensive disorder-Norvasc resumed, monitor      Close exposure to COVID-19 virus-patient to be admitted due to Gastroenteritis with elevated lactic acid, however, she states she has been exposed to family members and states she has had fever, chills, cough, dyspnea, chest congestion with nausea and vomiting. Discussed with Attending Physician and though [address] [place] [address] rapid test was negative feel she needs to be placed in isolation at this time with further COVID-19 testing performed due to exposure to family members and multitude of symptoms. COVID-19 order set completed to include Lovenox 30 mg BID as she has no contraindications but does not require full anticoagulation at this time.  Discussed with clinical house and Estela Kwok PA-C in ED    Further orders per clinical course/attending      Signed:  Estela العراقي PA-C

## 2020-07-07 LAB
ALBUMIN SERPL-MCNC: 4.2 G/DL (ref 3.5–5.2)
ALP BLD-CCNC: 93 U/L (ref 35–104)
ALT SERPL-CCNC: 17 U/L (ref 5–33)
ANION GAP SERPL CALCULATED.3IONS-SCNC: 13 MMOL/L (ref 7–19)
AST SERPL-CCNC: 18 U/L (ref 5–32)
BASOPHILS ABSOLUTE: 0 K/UL (ref 0–0.2)
BASOPHILS RELATIVE PERCENT: 0.2 % (ref 0–1)
BILIRUB SERPL-MCNC: 0.3 MG/DL (ref 0.2–1.2)
BUN BLDV-MCNC: 11 MG/DL (ref 6–20)
CALCIUM SERPL-MCNC: 8.7 MG/DL (ref 8.6–10)
CHLORIDE BLD-SCNC: 98 MMOL/L (ref 98–111)
CO2: 24 MMOL/L (ref 22–29)
CREAT SERPL-MCNC: 0.5 MG/DL (ref 0.5–0.9)
EOSINOPHILS ABSOLUTE: 0 K/UL (ref 0–0.6)
EOSINOPHILS RELATIVE PERCENT: 0 % (ref 0–5)
GFR NON-AFRICAN AMERICAN: >60
GLUCOSE BLD-MCNC: 107 MG/DL (ref 74–109)
HCT VFR BLD CALC: 39.4 % (ref 37–47)
HEMOGLOBIN: 13 G/DL (ref 12–16)
IMMATURE GRANULOCYTES #: 0.1 K/UL
LYMPHOCYTES ABSOLUTE: 1.9 K/UL (ref 1.1–4.5)
LYMPHOCYTES RELATIVE PERCENT: 15.4 % (ref 20–40)
MAGNESIUM: 1.9 MG/DL (ref 1.6–2.6)
MCH RBC QN AUTO: 27.1 PG (ref 27–31)
MCHC RBC AUTO-ENTMCNC: 33 G/DL (ref 33–37)
MCV RBC AUTO: 82.1 FL (ref 81–99)
MONOCYTES ABSOLUTE: 0.9 K/UL (ref 0–0.9)
MONOCYTES RELATIVE PERCENT: 7.3 % (ref 0–10)
NEUTROPHILS ABSOLUTE: 9.7 K/UL (ref 1.5–7.5)
NEUTROPHILS RELATIVE PERCENT: 76.6 % (ref 50–65)
PDW BLD-RTO: 14.9 % (ref 11.5–14.5)
PLATELET # BLD: 373 K/UL (ref 130–400)
PMV BLD AUTO: 10.4 FL (ref 9.4–12.3)
POTASSIUM SERPL-SCNC: 3.4 MMOL/L (ref 3.5–5)
RBC # BLD: 4.8 M/UL (ref 4.2–5.4)
SODIUM BLD-SCNC: 135 MMOL/L (ref 136–145)
TOTAL PROTEIN: 7.2 G/DL (ref 6.6–8.7)
WBC # BLD: 12.6 K/UL (ref 4.8–10.8)

## 2020-07-07 PROCEDURE — 6360000002 HC RX W HCPCS: Performed by: PHYSICIAN ASSISTANT

## 2020-07-07 PROCEDURE — 6360000002 HC RX W HCPCS: Performed by: INTERNAL MEDICINE

## 2020-07-07 PROCEDURE — 80053 COMPREHEN METABOLIC PANEL: CPT

## 2020-07-07 PROCEDURE — 2580000003 HC RX 258: Performed by: PHYSICIAN ASSISTANT

## 2020-07-07 PROCEDURE — 6370000000 HC RX 637 (ALT 250 FOR IP): Performed by: PHYSICIAN ASSISTANT

## 2020-07-07 PROCEDURE — 96366 THER/PROPH/DIAG IV INF ADDON: CPT

## 2020-07-07 PROCEDURE — 96376 TX/PRO/DX INJ SAME DRUG ADON: CPT

## 2020-07-07 PROCEDURE — 2500000003 HC RX 250 WO HCPCS: Performed by: PHYSICIAN ASSISTANT

## 2020-07-07 PROCEDURE — 83735 ASSAY OF MAGNESIUM: CPT

## 2020-07-07 PROCEDURE — 36415 COLL VENOUS BLD VENIPUNCTURE: CPT

## 2020-07-07 PROCEDURE — G0378 HOSPITAL OBSERVATION PER HR: HCPCS

## 2020-07-07 PROCEDURE — 6370000000 HC RX 637 (ALT 250 FOR IP): Performed by: INTERNAL MEDICINE

## 2020-07-07 PROCEDURE — 85025 COMPLETE CBC W/AUTO DIFF WBC: CPT

## 2020-07-07 RX ORDER — PROMETHAZINE HYDROCHLORIDE 25 MG/1
25 SUPPOSITORY RECTAL EVERY 6 HOURS PRN
Status: DISCONTINUED | OUTPATIENT
Start: 2020-07-07 | End: 2020-07-10 | Stop reason: HOSPADM

## 2020-07-07 RX ORDER — BUPROPION HYDROCHLORIDE 100 MG/1
100 TABLET, EXTENDED RELEASE ORAL DAILY
Status: DISCONTINUED | OUTPATIENT
Start: 2020-07-07 | End: 2020-07-10 | Stop reason: HOSPADM

## 2020-07-07 RX ORDER — LAMOTRIGINE 100 MG/1
100 TABLET ORAL DAILY
Status: DISCONTINUED | OUTPATIENT
Start: 2020-07-07 | End: 2020-07-10 | Stop reason: HOSPADM

## 2020-07-07 RX ADMIN — LAMOTRIGINE 100 MG: 100 TABLET ORAL at 09:36

## 2020-07-07 RX ADMIN — METRONIDAZOLE 500 MG: 500 INJECTION, SOLUTION INTRAVENOUS at 18:45

## 2020-07-07 RX ADMIN — METRONIDAZOLE 500 MG: 500 INJECTION, SOLUTION INTRAVENOUS at 10:09

## 2020-07-07 RX ADMIN — KETOROLAC TROMETHAMINE 30 MG: 30 INJECTION, SOLUTION INTRAMUSCULAR at 04:00

## 2020-07-07 RX ADMIN — METRONIDAZOLE 500 MG: 500 INJECTION, SOLUTION INTRAVENOUS at 02:48

## 2020-07-07 RX ADMIN — ONDANSETRON 4 MG: 2 INJECTION INTRAMUSCULAR; INTRAVENOUS at 08:26

## 2020-07-07 RX ADMIN — HYDRALAZINE HYDROCHLORIDE 10 MG: 20 INJECTION INTRAMUSCULAR; INTRAVENOUS at 20:51

## 2020-07-07 RX ADMIN — METOPROLOL TARTRATE 25 MG: 25 TABLET, FILM COATED ORAL at 08:48

## 2020-07-07 RX ADMIN — ONDANSETRON 4 MG: 2 INJECTION INTRAMUSCULAR; INTRAVENOUS at 20:56

## 2020-07-07 RX ADMIN — SERTRALINE HYDROCHLORIDE 50 MG: 50 TABLET ORAL at 08:48

## 2020-07-07 RX ADMIN — PROMETHAZINE HYDROCHLORIDE 12.5 MG: 12.5 TABLET ORAL at 10:09

## 2020-07-07 RX ADMIN — Medication 10 ML: at 20:51

## 2020-07-07 RX ADMIN — Medication 10 ML: at 08:49

## 2020-07-07 RX ADMIN — PROMETHAZINE HYDROCHLORIDE 25 MG: 25 SUPPOSITORY RECTAL at 16:16

## 2020-07-07 RX ADMIN — CIPROFLOXACIN 400 MG: 2 INJECTION, SOLUTION INTRAVENOUS at 21:57

## 2020-07-07 RX ADMIN — CIPROFLOXACIN 400 MG: 2 INJECTION, SOLUTION INTRAVENOUS at 11:24

## 2020-07-07 RX ADMIN — BUPROPION HYDROCHLORIDE 100 MG: 100 TABLET, FILM COATED, EXTENDED RELEASE ORAL at 09:37

## 2020-07-07 RX ADMIN — POTASSIUM CHLORIDE 40 MEQ: 1500 TABLET, EXTENDED RELEASE ORAL at 08:48

## 2020-07-07 RX ADMIN — METOPROLOL TARTRATE 25 MG: 25 TABLET, FILM COATED ORAL at 20:51

## 2020-07-07 RX ADMIN — AMLODIPINE BESYLATE 10 MG: 10 TABLET ORAL at 08:48

## 2020-07-07 RX ADMIN — HYDRALAZINE HYDROCHLORIDE 10 MG: 20 INJECTION INTRAMUSCULAR; INTRAVENOUS at 03:34

## 2020-07-07 RX ADMIN — KETOROLAC TROMETHAMINE 30 MG: 30 INJECTION, SOLUTION INTRAMUSCULAR at 20:55

## 2020-07-07 ASSESSMENT — ENCOUNTER SYMPTOMS
ABDOMINAL DISTENTION: 0
VOMITING: 1
ABDOMINAL PAIN: 1
WHEEZING: 0
STRIDOR: 0
TROUBLE SWALLOWING: 0
DIARRHEA: 0
CONSTIPATION: 0
PHOTOPHOBIA: 0
SINUS PAIN: 0
SHORTNESS OF BREATH: 0
COUGH: 0
SORE THROAT: 0
NAUSEA: 1

## 2020-07-07 ASSESSMENT — PAIN DESCRIPTION - FREQUENCY: FREQUENCY: CONTINUOUS

## 2020-07-07 ASSESSMENT — PAIN DESCRIPTION - LOCATION: LOCATION: NECK

## 2020-07-07 ASSESSMENT — PAIN - FUNCTIONAL ASSESSMENT: PAIN_FUNCTIONAL_ASSESSMENT: PREVENTS OR INTERFERES SOME ACTIVE ACTIVITIES AND ADLS

## 2020-07-07 ASSESSMENT — PAIN DESCRIPTION - PAIN TYPE: TYPE: CHRONIC PAIN

## 2020-07-07 ASSESSMENT — PAIN SCALES - GENERAL
PAINLEVEL_OUTOF10: 7
PAINLEVEL_OUTOF10: 7

## 2020-07-07 ASSESSMENT — PAIN DESCRIPTION - ONSET: ONSET: ON-GOING

## 2020-07-07 ASSESSMENT — PAIN DESCRIPTION - PROGRESSION: CLINICAL_PROGRESSION: GRADUALLY IMPROVING

## 2020-07-07 ASSESSMENT — PAIN DESCRIPTION - DESCRIPTORS: DESCRIPTORS: ACHING

## 2020-07-07 NOTE — PLAN OF CARE
Problem: Falls - Risk of:  Goal: Will remain free from falls  Description: Will remain free from falls  Outcome: Ongoing  Goal: Absence of physical injury  Description: Absence of physical injury  Outcome: Ongoing     Problem: Fluid Volume - Imbalance:  Goal: Absence of imbalanced fluid volume signs and symptoms  Description: Absence of imbalanced fluid volume signs and symptoms  Outcome: Ongoing     Problem: Pain:  Goal: Pain level will decrease  Description: Pain level will decrease  Outcome: Ongoing  Goal: Control of acute pain  Description: Control of acute pain  Outcome: Ongoing  Goal: Control of chronic pain  Description: Control of chronic pain  Outcome: Ongoing

## 2020-07-07 NOTE — PLAN OF CARE
Problem: Falls - Risk of:  Goal: Will remain free from falls  Description: Will remain free from falls  7/7/2020 0739 by Cleopatra Cortez RN  Outcome: Ongoing  7/7/2020 0418 by Karina Kline RN  Outcome: Ongoing  Goal: Absence of physical injury  Description: Absence of physical injury  7/7/2020 0739 by Cleopatra Cortez RN  Outcome: Ongoing  7/7/2020 0418 by Karina Kline RN  Outcome: Ongoing     Problem: Fluid Volume - Imbalance:  Goal: Absence of imbalanced fluid volume signs and symptoms  Description: Absence of imbalanced fluid volume signs and symptoms  7/7/2020 0739 by Cleopatra Cortez RN  Outcome: Ongoing  7/7/2020 0418 by Karina Kline RN  Outcome: Ongoing     Problem: Pain:  Goal: Pain level will decrease  Description: Pain level will decrease  7/7/2020 0739 by Cleopatra Cortez RN  Outcome: Ongoing  7/7/2020 0418 by Karina Kline RN  Outcome: Ongoing  Goal: Control of acute pain  Description: Control of acute pain  7/7/2020 0739 by Cleopatra Cortez RN  Outcome: Ongoing  7/7/2020 0418 by Karina Kline RN  Outcome: Ongoing  Goal: Control of chronic pain  Description: Control of chronic pain  7/7/2020 0739 by Cleopatra Cortez RN  Outcome: Ongoing  7/7/2020 0418 by Karina Kline RN  Outcome: Ongoing

## 2020-07-08 PROBLEM — A09 GASTROENTERITIS, INFECTIOUS: Status: ACTIVE | Noted: 2020-07-08

## 2020-07-08 LAB
ALBUMIN SERPL-MCNC: 4.4 G/DL (ref 3.5–5.2)
ALP BLD-CCNC: 94 U/L (ref 35–104)
ALT SERPL-CCNC: 16 U/L (ref 5–33)
ANION GAP SERPL CALCULATED.3IONS-SCNC: 14 MMOL/L (ref 7–19)
AST SERPL-CCNC: 15 U/L (ref 5–32)
BASOPHILS ABSOLUTE: 0 K/UL (ref 0–0.2)
BASOPHILS RELATIVE PERCENT: 0.3 % (ref 0–1)
BILIRUB SERPL-MCNC: 0.4 MG/DL (ref 0.2–1.2)
BUN BLDV-MCNC: 12 MG/DL (ref 6–20)
CALCIUM SERPL-MCNC: 8.8 MG/DL (ref 8.6–10)
CHLORIDE BLD-SCNC: 96 MMOL/L (ref 98–111)
CO2: 21 MMOL/L (ref 22–29)
CREAT SERPL-MCNC: 0.5 MG/DL (ref 0.5–0.9)
EOSINOPHILS ABSOLUTE: 0 K/UL (ref 0–0.6)
EOSINOPHILS RELATIVE PERCENT: 0 % (ref 0–5)
GFR NON-AFRICAN AMERICAN: >60
GLUCOSE BLD-MCNC: 96 MG/DL (ref 74–109)
HCT VFR BLD CALC: 42.5 % (ref 37–47)
HEMOGLOBIN: 13.5 G/DL (ref 12–16)
IMMATURE GRANULOCYTES #: 0.1 K/UL
LYMPHOCYTES ABSOLUTE: 2.3 K/UL (ref 1.1–4.5)
LYMPHOCYTES RELATIVE PERCENT: 16.3 % (ref 20–40)
MAGNESIUM: 2 MG/DL (ref 1.6–2.6)
MCH RBC QN AUTO: 26.7 PG (ref 27–31)
MCHC RBC AUTO-ENTMCNC: 31.8 G/DL (ref 33–37)
MCV RBC AUTO: 84 FL (ref 81–99)
MONOCYTES ABSOLUTE: 1.2 K/UL (ref 0–0.9)
MONOCYTES RELATIVE PERCENT: 8.5 % (ref 0–10)
NEUTROPHILS ABSOLUTE: 10.3 K/UL (ref 1.5–7.5)
NEUTROPHILS RELATIVE PERCENT: 74.5 % (ref 50–65)
PDW BLD-RTO: 14.9 % (ref 11.5–14.5)
PLATELET # BLD: 351 K/UL (ref 130–400)
PMV BLD AUTO: 10.5 FL (ref 9.4–12.3)
POTASSIUM SERPL-SCNC: 3.8 MMOL/L (ref 3.5–5)
RBC # BLD: 5.06 M/UL (ref 4.2–5.4)
SODIUM BLD-SCNC: 131 MMOL/L (ref 136–145)
TOTAL PROTEIN: 7 G/DL (ref 6.6–8.7)
WBC # BLD: 13.9 K/UL (ref 4.8–10.8)

## 2020-07-08 PROCEDURE — 6360000002 HC RX W HCPCS: Performed by: PHYSICIAN ASSISTANT

## 2020-07-08 PROCEDURE — 6370000000 HC RX 637 (ALT 250 FOR IP): Performed by: PHYSICIAN ASSISTANT

## 2020-07-08 PROCEDURE — 36415 COLL VENOUS BLD VENIPUNCTURE: CPT

## 2020-07-08 PROCEDURE — 96366 THER/PROPH/DIAG IV INF ADDON: CPT

## 2020-07-08 PROCEDURE — 96376 TX/PRO/DX INJ SAME DRUG ADON: CPT

## 2020-07-08 PROCEDURE — 2580000003 HC RX 258: Performed by: PHYSICIAN ASSISTANT

## 2020-07-08 PROCEDURE — 2580000003 HC RX 258: Performed by: HOSPITALIST

## 2020-07-08 PROCEDURE — 2500000003 HC RX 250 WO HCPCS: Performed by: PHYSICIAN ASSISTANT

## 2020-07-08 PROCEDURE — 80053 COMPREHEN METABOLIC PANEL: CPT

## 2020-07-08 PROCEDURE — 6370000000 HC RX 637 (ALT 250 FOR IP): Performed by: HOSPITALIST

## 2020-07-08 PROCEDURE — 85025 COMPLETE CBC W/AUTO DIFF WBC: CPT

## 2020-07-08 PROCEDURE — 83735 ASSAY OF MAGNESIUM: CPT

## 2020-07-08 PROCEDURE — 6360000002 HC RX W HCPCS: Performed by: INTERNAL MEDICINE

## 2020-07-08 PROCEDURE — 1210000000 HC MED SURG R&B

## 2020-07-08 PROCEDURE — 6360000002 HC RX W HCPCS: Performed by: HOSPITALIST

## 2020-07-08 RX ORDER — PROMETHAZINE HYDROCHLORIDE 25 MG/ML
25 INJECTION, SOLUTION INTRAMUSCULAR; INTRAVENOUS EVERY 6 HOURS PRN
Status: DISCONTINUED | OUTPATIENT
Start: 2020-07-08 | End: 2020-07-10 | Stop reason: HOSPADM

## 2020-07-08 RX ORDER — SODIUM CHLORIDE 9 MG/ML
INJECTION, SOLUTION INTRAVENOUS CONTINUOUS
Status: DISCONTINUED | OUTPATIENT
Start: 2020-07-08 | End: 2020-07-08

## 2020-07-08 RX ORDER — METOPROLOL TARTRATE 50 MG/1
50 TABLET, FILM COATED ORAL 2 TIMES DAILY
Status: DISCONTINUED | OUTPATIENT
Start: 2020-07-08 | End: 2020-07-10 | Stop reason: HOSPADM

## 2020-07-08 RX ORDER — SODIUM CHLORIDE, SODIUM LACTATE, POTASSIUM CHLORIDE, CALCIUM CHLORIDE 600; 310; 30; 20 MG/100ML; MG/100ML; MG/100ML; MG/100ML
INJECTION, SOLUTION INTRAVENOUS CONTINUOUS
Status: DISCONTINUED | OUTPATIENT
Start: 2020-07-08 | End: 2020-07-09

## 2020-07-08 RX ADMIN — METOPROLOL TARTRATE 50 MG: 50 TABLET, FILM COATED ORAL at 21:00

## 2020-07-08 RX ADMIN — KETOROLAC TROMETHAMINE 30 MG: 30 INJECTION, SOLUTION INTRAMUSCULAR at 16:10

## 2020-07-08 RX ADMIN — ONDANSETRON 4 MG: 2 INJECTION INTRAMUSCULAR; INTRAVENOUS at 15:18

## 2020-07-08 RX ADMIN — PROMETHAZINE HYDROCHLORIDE 12.5 MG: 12.5 TABLET ORAL at 10:35

## 2020-07-08 RX ADMIN — CIPROFLOXACIN 400 MG: 2 INJECTION, SOLUTION INTRAVENOUS at 10:35

## 2020-07-08 RX ADMIN — METRONIDAZOLE 500 MG: 500 INJECTION, SOLUTION INTRAVENOUS at 02:04

## 2020-07-08 RX ADMIN — SODIUM CHLORIDE, POTASSIUM CHLORIDE, SODIUM LACTATE AND CALCIUM CHLORIDE: 600; 310; 30; 20 INJECTION, SOLUTION INTRAVENOUS at 13:01

## 2020-07-08 RX ADMIN — PROMETHAZINE HYDROCHLORIDE 25 MG: 25 INJECTION INTRAMUSCULAR; INTRAVENOUS at 22:06

## 2020-07-08 RX ADMIN — PROMETHAZINE HYDROCHLORIDE 12.5 MG: 12.5 TABLET ORAL at 16:03

## 2020-07-08 RX ADMIN — HYDRALAZINE HYDROCHLORIDE 10 MG: 20 INJECTION INTRAMUSCULAR; INTRAVENOUS at 03:14

## 2020-07-08 RX ADMIN — KETOROLAC TROMETHAMINE 30 MG: 30 INJECTION, SOLUTION INTRAMUSCULAR at 04:19

## 2020-07-08 RX ADMIN — METRONIDAZOLE 500 MG: 500 INJECTION, SOLUTION INTRAVENOUS at 17:38

## 2020-07-08 RX ADMIN — CIPROFLOXACIN 400 MG: 2 INJECTION, SOLUTION INTRAVENOUS at 22:06

## 2020-07-08 RX ADMIN — ONDANSETRON 4 MG: 2 INJECTION INTRAMUSCULAR; INTRAVENOUS at 09:05

## 2020-07-08 RX ADMIN — Medication 10 ML: at 21:00

## 2020-07-08 RX ADMIN — METRONIDAZOLE 500 MG: 500 INJECTION, SOLUTION INTRAVENOUS at 09:05

## 2020-07-08 RX ADMIN — SODIUM CHLORIDE, PRESERVATIVE FREE 10 ML: 5 INJECTION INTRAVENOUS at 16:10

## 2020-07-08 ASSESSMENT — ENCOUNTER SYMPTOMS
DIARRHEA: 0
NAUSEA: 1
PHOTOPHOBIA: 0
SHORTNESS OF BREATH: 0
CONSTIPATION: 0
SORE THROAT: 0
SINUS PAIN: 0
STRIDOR: 0
VOMITING: 1
TROUBLE SWALLOWING: 0
WHEEZING: 0
ABDOMINAL DISTENTION: 0
COUGH: 0
ABDOMINAL PAIN: 1

## 2020-07-08 ASSESSMENT — PAIN DESCRIPTION - LOCATION
LOCATION: NECK
LOCATION: NECK

## 2020-07-08 ASSESSMENT — PAIN DESCRIPTION - ORIENTATION: ORIENTATION: LOWER

## 2020-07-08 ASSESSMENT — PAIN SCALES - GENERAL
PAINLEVEL_OUTOF10: 6
PAINLEVEL_OUTOF10: 7
PAINLEVEL_OUTOF10: 5

## 2020-07-08 ASSESSMENT — PAIN DESCRIPTION - DESCRIPTORS
DESCRIPTORS: SPASM
DESCRIPTORS: SPASM

## 2020-07-08 ASSESSMENT — PAIN DESCRIPTION - FREQUENCY: FREQUENCY: INTERMITTENT

## 2020-07-08 ASSESSMENT — PAIN DESCRIPTION - PROGRESSION: CLINICAL_PROGRESSION: NOT CHANGED

## 2020-07-08 ASSESSMENT — PAIN - FUNCTIONAL ASSESSMENT: PAIN_FUNCTIONAL_ASSESSMENT: PREVENTS OR INTERFERES SOME ACTIVE ACTIVITIES AND ADLS

## 2020-07-08 ASSESSMENT — PAIN DESCRIPTION - ONSET: ONSET: ON-GOING

## 2020-07-08 ASSESSMENT — PAIN DESCRIPTION - PAIN TYPE
TYPE: CHRONIC PAIN
TYPE: CHRONIC PAIN

## 2020-07-08 NOTE — PROGRESS NOTES
[facility] Hospitalists      Patient:  Marisela Sunshine  YOB: 1985  Date of Service: 7/8/2020  MRN: [id number]   Acct: [de-identified]   Primary Care Physician: Katey Melton MD  Advance Directive: Full Code  Admit Date: 7/5/2020       Hospital Day: 0    CHIEF COMPLAINT: Nausea with Vomiting. SUBJECTIVE:     Pt in bed more alert today and communicative. Pt complains of continued nausea, vomiting, and generalized abdominal pain. PT denies any diarrhea. Cumulative hospital course:   7/05/2020 the patient is a 79-year-old female with past medical history of hypertension depression who presented to [address] [place] [address] ED complaining of fever body aches chills nonproductive cough chest congestion with nausea and vomiting. Patient admits to avoid exposure and states daughter has same symptoms as she is experiencing now. In ED patient is hypertensive labs show elevated lactic acid, hypokalemia, leukocytosis. Chest x-ray unremarkable. CT abdomen pelvis reveals nonspecific colitis. Rapid COVID negative. COVID send out as patient admits to exposure. 7/06/2020 patient more hypertensive today with some tachycardia. Patient started on Cipro/Flagyl and Lopressor 25 twice daily. Stool cultures ordered. Awaiting COVID send out results. BP improving. Temp 100.1 with intermittent tachycardia. 7/07/2020 Pt with continued N/V and abdominal pain. Pt unable to keep food down. Pt afebrile today. 7/08/2020 Pt more alert and talkative today. Still with N/V/ abdominal pain. IV phenergan continued. Pt hyponatremia. Iv fluids switched to LR. Review of Systems:   Review of Systems   Constitutional: Negative for chills, diaphoresis, fatigue and fever. HENT: Negative for congestion, ear pain, sinus pain, sore throat and trouble swallowing. Eyes: Negative for photophobia and visual disturbance. Respiratory: Negative for cough, shortness of breath, wheezing and stridor.     Cardiovascular: Negative for chest pain, palpitations and leg swelling. Gastrointestinal: Positive for abdominal pain, nausea and vomiting. Negative for abdominal distention, constipation and diarrhea. Endocrine: Negative for cold intolerance and heat intolerance. Genitourinary: Negative for difficulty urinating, flank pain, frequency and urgency. Musculoskeletal: Negative for arthralgias and myalgias. Neurological: Negative for dizziness, syncope, weakness, light-headedness, numbness and headaches. Hematological: Does not bruise/bleed easily. Psychiatric/Behavioral: Negative for agitation, confusion and dysphoric mood. Objective:   VITALS:  BP (!) 160/103   Pulse 94   Temp 99.4 °F (37.4 °C) (Temporal)   Resp 18   Ht 5' 9\" (1.753 m)   Wt 148 lb 11.2 oz (67.4 kg)   SpO2 95%   BMI 21.96 kg/m²   24HR INTAKE/OUTPUT:      Intake/Output Summary (Last 24 hours) at 7/8/2020 1438  Last data filed at 7/8/2020 1400  Gross per 24 hour   Intake 1864 ml   Output 600 ml   Net 1264 ml       Physical Exam  Constitutional:       General: She is not in acute distress. Appearance: She is ill-appearing. She is not toxic-appearing or diaphoretic. HENT:      Head: Normocephalic and atraumatic. Right Ear: External ear normal.      Left Ear: External ear normal.      Nose: Nose normal. No congestion or rhinorrhea. Mouth/Throat:      Mouth: Mucous membranes are moist.      Pharynx: Oropharynx is clear. Eyes:      General: No scleral icterus. Extraocular Movements: Extraocular movements intact. Conjunctiva/sclera: Conjunctivae normal.   Neck:      Musculoskeletal: Normal range of motion and neck supple. Cardiovascular:      Rate and Rhythm: Normal rate and regular rhythm. Pulses: Normal pulses. Heart sounds: Normal heart sounds. No murmur. No friction rub. No gallop. Pulmonary:      Effort: Pulmonary effort is normal. No respiratory distress. Breath sounds: Normal breath sounds. No stridor.  No wheezing, rhonchi or rales. Abdominal:      General: Abdomen is flat. Bowel sounds are normal. There is no distension. Palpations: Abdomen is soft. Tenderness: There is abdominal tenderness. Musculoskeletal: Normal range of motion. General: No swelling. Right lower leg: No edema. Left lower leg: No edema. Skin:     General: Skin is warm. Coloration: Skin is not jaundiced. Findings: No erythema, lesion or rash. Neurological:      General: No focal deficit present. Mental Status: She is oriented to person, place, and time. Mental status is at baseline. Cranial Nerves: No cranial nerve deficit. Sensory: No sensory deficit. Motor: No weakness. Psychiatric:         Mood and Affect: Mood normal.         Behavior: Behavior normal.         Thought Content:  Thought content normal.         Judgment: Judgment normal.          Medications:      lactated ringers 150 mL/hr at 07/08/20 1301      metoprolol tartrate  50 mg Oral BID    buPROPion  100 mg Oral Daily    lamoTRIgine  100 mg Oral Daily    ciprofloxacin  400 mg Intravenous Q12H    metroNIDAZOLE  500 mg Intravenous Q8H    amLODIPine  10 mg Oral Daily    sertraline  50 mg Oral Daily    sodium chloride flush  10 mL Intravenous 2 times per day     promethazine, ketorolac, hydrALAZINE, acetaminophen, sodium chloride flush, polyethylene glycol, promethazine **OR** ondansetron, potassium chloride **OR** potassium alternative oral replacement **OR** potassium chloride, magnesium sulfate  DIET CLEAR LIQUID;     Lab and other Data:     Recent Labs     07/05/20  2145 07/07/20  0455 07/08/20  0206   WBC 13.8* 12.6* 13.9*   HGB 13.2 13.0 13.5    373 351     Recent Labs     07/06/20  0510 07/07/20  0455 07/08/20  0206   * 135* 131*   K 4.1 3.4* 3.8    98 96*   CO2 20* 24 21*   BUN 8 11 12   CREATININE <0.5 0.5 0.5   GLUCOSE 112* 107 96     Recent Labs     07/06/20  0510 07/07/20  0455 07/08/20  0206 AST 27 18 15   ALT 22 17 16   BILITOT <0.2 0.3 0.4   ALKPHOS 107* 93 94     INR:   Recent Labs     07/05/20 2145   INR 1.01     UA:  Recent Labs     07/05/20  1710   COLORU YELLOW   PHUR 7.5   CLARITYU Clear   SPECGRAV 1.022   LEUKOCYTESUR Negative   UROBILINOGEN 0.2   BILIRUBINUR Negative   BLOODU Negative   GLUCOSEU Negative       RAD:   Ct Abdomen Pelvis W Iv Contrast Additional Contrast? None  Result Date: 7/5/2020  1. No acute inflammatory process seen in the abdomen or pelvis. Wall thickening of the colon may be artifactual due to underdistention. Nonspecific colitis considered. No bowel obstruction. No appendicitis. No free air or abscess. 2. Subcentimeter hypodense lesions within the superior liver likely incidental cysts. Subcentimeter superior left renal cyst. Distended bladder. Signed by Dr Urszula Drake on 7/5/2020 6:56 PM    Xr Chest Portable  Result Date: 7/5/2020  Findings: The lungs are well expanded and clear. The cardiomediastinal contours are normal. There is no pleural effusion or pneumothorax. No acute bony pathology is identified. 1. No acute radiographic cardiopulmonary process. Signed by Dr Urszula Drake on 7/5/2020 5:59 PM      Micro:  Culture, Stool [[id number]]    Order Status: No result Specimen: Stool      Rapid influenza A/B antigens [[id number]] Collected: 07/05/20 1425   Order Status: Completed Specimen: Nasopharyngeal Updated: 07/05/20 1454    Rapid Influenza A Ag Negative    Rapid Influenza B Ag Negative     Culture, Blood 1 [[id number]] Collected: 07/05/20 2145   Order Status: Completed Specimen: Blood Updated: 07/07/20 0514    Blood Culture, Routine No Growth to date.  Any change in status will be called. Assessment/Plan   Principal Problem:    Gastroenteritis-  continue IVF's and clear liquids for now. Day #3 Cipro/Flagyl. Pt continues having emesis, continue IV Phenergan.      Active Problems:   Hyponatremia- IV LR     Lactic Acidosis- resolved.  WBC trending down.      Hypertensive disorder-Norvasc resumed, Lopressor 25 BID added.       Close exposure to COVID-19 virus- Covid rapid and PCR negative       Hypokalemia- resolved       Anjali Zapien PA-C  7/8/2020, 2:38 PM

## 2020-07-09 ENCOUNTER — APPOINTMENT (OUTPATIENT)
Dept: GENERAL RADIOLOGY | Age: 35
DRG: 392 | End: 2020-07-09
Payer: COMMERCIAL

## 2020-07-09 LAB
ALBUMIN SERPL-MCNC: 4.2 G/DL (ref 3.5–5.2)
ALP BLD-CCNC: 89 U/L (ref 35–104)
ALT SERPL-CCNC: 13 U/L (ref 5–33)
ANION GAP SERPL CALCULATED.3IONS-SCNC: 16 MMOL/L (ref 7–19)
AST SERPL-CCNC: 16 U/L (ref 5–32)
BASOPHILS ABSOLUTE: 0.1 K/UL (ref 0–0.2)
BASOPHILS RELATIVE PERCENT: 0.7 % (ref 0–1)
BILIRUB SERPL-MCNC: 0.4 MG/DL (ref 0.2–1.2)
BUN BLDV-MCNC: 9 MG/DL (ref 6–20)
CALCIUM SERPL-MCNC: 8.7 MG/DL (ref 8.6–10)
CHLORIDE BLD-SCNC: 99 MMOL/L (ref 98–111)
CO2: 21 MMOL/L (ref 22–29)
CREAT SERPL-MCNC: 0.5 MG/DL (ref 0.5–0.9)
EOSINOPHILS ABSOLUTE: 0 K/UL (ref 0–0.6)
EOSINOPHILS RELATIVE PERCENT: 0.1 % (ref 0–5)
GFR NON-AFRICAN AMERICAN: >60
GLUCOSE BLD-MCNC: 99 MG/DL (ref 74–109)
HCT VFR BLD CALC: 42.1 % (ref 37–47)
HEMOGLOBIN: 14.1 G/DL (ref 12–16)
IMMATURE GRANULOCYTES #: 0 K/UL
LYMPHOCYTES ABSOLUTE: 2.6 K/UL (ref 1.1–4.5)
LYMPHOCYTES RELATIVE PERCENT: 25.6 % (ref 20–40)
MAGNESIUM: 2.1 MG/DL (ref 1.6–2.6)
MCH RBC QN AUTO: 27.2 PG (ref 27–31)
MCHC RBC AUTO-ENTMCNC: 33.5 G/DL (ref 33–37)
MCV RBC AUTO: 81.3 FL (ref 81–99)
MONOCYTES ABSOLUTE: 1 K/UL (ref 0–0.9)
MONOCYTES RELATIVE PERCENT: 10.2 % (ref 0–10)
NEUTROPHILS ABSOLUTE: 6.4 K/UL (ref 1.5–7.5)
NEUTROPHILS RELATIVE PERCENT: 63 % (ref 50–65)
PDW BLD-RTO: 14.7 % (ref 11.5–14.5)
PLATELET # BLD: 380 K/UL (ref 130–400)
PMV BLD AUTO: 10.7 FL (ref 9.4–12.3)
POTASSIUM SERPL-SCNC: 4 MMOL/L (ref 3.5–5)
RBC # BLD: 5.18 M/UL (ref 4.2–5.4)
SODIUM BLD-SCNC: 136 MMOL/L (ref 136–145)
TOTAL PROTEIN: 6.8 G/DL (ref 6.6–8.7)
WBC # BLD: 10.1 K/UL (ref 4.8–10.8)

## 2020-07-09 PROCEDURE — 85025 COMPLETE CBC W/AUTO DIFF WBC: CPT

## 2020-07-09 PROCEDURE — 74018 RADEX ABDOMEN 1 VIEW: CPT

## 2020-07-09 PROCEDURE — 6360000002 HC RX W HCPCS: Performed by: PHYSICIAN ASSISTANT

## 2020-07-09 PROCEDURE — 2500000003 HC RX 250 WO HCPCS: Performed by: PHYSICIAN ASSISTANT

## 2020-07-09 PROCEDURE — 83735 ASSAY OF MAGNESIUM: CPT

## 2020-07-09 PROCEDURE — 80053 COMPREHEN METABOLIC PANEL: CPT

## 2020-07-09 PROCEDURE — 6370000000 HC RX 637 (ALT 250 FOR IP): Performed by: HOSPITALIST

## 2020-07-09 PROCEDURE — 6370000000 HC RX 637 (ALT 250 FOR IP): Performed by: INTERNAL MEDICINE

## 2020-07-09 PROCEDURE — 6370000000 HC RX 637 (ALT 250 FOR IP): Performed by: PHYSICIAN ASSISTANT

## 2020-07-09 PROCEDURE — 6360000002 HC RX W HCPCS: Performed by: HOSPITALIST

## 2020-07-09 PROCEDURE — 2580000003 HC RX 258: Performed by: PHYSICIAN ASSISTANT

## 2020-07-09 PROCEDURE — 2580000003 HC RX 258: Performed by: HOSPITALIST

## 2020-07-09 PROCEDURE — 36415 COLL VENOUS BLD VENIPUNCTURE: CPT

## 2020-07-09 PROCEDURE — 1210000000 HC MED SURG R&B

## 2020-07-09 PROCEDURE — 6360000002 HC RX W HCPCS: Performed by: INTERNAL MEDICINE

## 2020-07-09 RX ORDER — SIMETHICONE 80 MG
80 TABLET,CHEWABLE ORAL 4 TIMES DAILY
Status: DISCONTINUED | OUTPATIENT
Start: 2020-07-09 | End: 2020-07-10 | Stop reason: HOSPADM

## 2020-07-09 RX ORDER — ONDANSETRON 4 MG/1
4 TABLET, FILM COATED ORAL
Status: DISCONTINUED | OUTPATIENT
Start: 2020-07-09 | End: 2020-07-10 | Stop reason: HOSPADM

## 2020-07-09 RX ADMIN — AMLODIPINE BESYLATE 10 MG: 10 TABLET ORAL at 08:33

## 2020-07-09 RX ADMIN — METOPROLOL TARTRATE 50 MG: 50 TABLET, FILM COATED ORAL at 08:32

## 2020-07-09 RX ADMIN — SIMETHICONE CHEW TAB 80 MG 80 MG: 80 TABLET ORAL at 21:19

## 2020-07-09 RX ADMIN — SODIUM CHLORIDE, POTASSIUM CHLORIDE, SODIUM LACTATE AND CALCIUM CHLORIDE: 600; 310; 30; 20 INJECTION, SOLUTION INTRAVENOUS at 01:47

## 2020-07-09 RX ADMIN — HYDRALAZINE HYDROCHLORIDE 10 MG: 20 INJECTION INTRAMUSCULAR; INTRAVENOUS at 00:39

## 2020-07-09 RX ADMIN — LAMOTRIGINE 100 MG: 100 TABLET ORAL at 08:33

## 2020-07-09 RX ADMIN — CIPROFLOXACIN 400 MG: 2 INJECTION, SOLUTION INTRAVENOUS at 10:04

## 2020-07-09 RX ADMIN — PROMETHAZINE HYDROCHLORIDE 25 MG: 25 INJECTION INTRAMUSCULAR; INTRAVENOUS at 09:15

## 2020-07-09 RX ADMIN — CIPROFLOXACIN 400 MG: 2 INJECTION, SOLUTION INTRAVENOUS at 22:34

## 2020-07-09 RX ADMIN — ONDANSETRON 4 MG: 2 INJECTION INTRAMUSCULAR; INTRAVENOUS at 12:20

## 2020-07-09 RX ADMIN — Medication 10 ML: at 22:35

## 2020-07-09 RX ADMIN — SODIUM CHLORIDE, PRESERVATIVE FREE 10 ML: 5 INJECTION INTRAVENOUS at 12:24

## 2020-07-09 RX ADMIN — METOPROLOL TARTRATE 50 MG: 50 TABLET, FILM COATED ORAL at 21:19

## 2020-07-09 RX ADMIN — PROMETHAZINE HYDROCHLORIDE 25 MG: 25 INJECTION INTRAMUSCULAR; INTRAVENOUS at 21:19

## 2020-07-09 RX ADMIN — ACETAMINOPHEN 650 MG: 325 TABLET, FILM COATED ORAL at 01:47

## 2020-07-09 RX ADMIN — ONDANSETRON HYDROCHLORIDE 4 MG: 4 TABLET, FILM COATED ORAL at 17:22

## 2020-07-09 RX ADMIN — SIMETHICONE CHEW TAB 80 MG 80 MG: 80 TABLET ORAL at 17:22

## 2020-07-09 RX ADMIN — METRONIDAZOLE 500 MG: 500 INJECTION, SOLUTION INTRAVENOUS at 17:31

## 2020-07-09 RX ADMIN — ONDANSETRON 4 MG: 2 INJECTION INTRAMUSCULAR; INTRAVENOUS at 06:05

## 2020-07-09 RX ADMIN — METRONIDAZOLE 500 MG: 500 INJECTION, SOLUTION INTRAVENOUS at 01:47

## 2020-07-09 RX ADMIN — SODIUM CHLORIDE, PRESERVATIVE FREE 10 ML: 5 INJECTION INTRAVENOUS at 09:37

## 2020-07-09 RX ADMIN — BUPROPION HYDROCHLORIDE 100 MG: 100 TABLET, FILM COATED, EXTENDED RELEASE ORAL at 08:33

## 2020-07-09 RX ADMIN — METRONIDAZOLE 500 MG: 500 INJECTION, SOLUTION INTRAVENOUS at 08:33

## 2020-07-09 RX ADMIN — SERTRALINE HYDROCHLORIDE 50 MG: 50 TABLET ORAL at 08:33

## 2020-07-09 ASSESSMENT — ENCOUNTER SYMPTOMS
VOMITING: 1
PHOTOPHOBIA: 0
SORE THROAT: 0
ABDOMINAL DISTENTION: 0
CONSTIPATION: 0
DIARRHEA: 0
NAUSEA: 1
STRIDOR: 0
ABDOMINAL PAIN: 1
WHEEZING: 0
TROUBLE SWALLOWING: 0
COUGH: 0
SINUS PAIN: 0
SHORTNESS OF BREATH: 0

## 2020-07-09 ASSESSMENT — PAIN SCALES - GENERAL: PAINLEVEL_OUTOF10: 3

## 2020-07-09 NOTE — PROGRESS NOTES
[facility]      Patient:  Lissa Mott  YOB: 1985  Date of Service: 7/9/2020  MRN: [id number]   Acct: [de-identified]   Primary Care Physician: Rogelio Tomlinson MD  Advance Directive: Full Code  Admit Date: 7/5/2020       Hospital Day: 1    CHIEF COMPLAINT: Nausea with Vomiting. SUBJECTIVE:     Pt in bed A&O. Pt ate breakfast around 9am and when this PA-C came to see the patient around 10:15 she was feeling well. Before leaving the room the patient began vomiting up a large amount of green/yellow fluid, most likely all she had ingested for breakfast. Pt states she feels like her stomach is full of air. Cumulative hospital course:   7/05/2020 the patient is a 31-year-old female with past medical history of hypertension depression who presented to [address] [place] [address] ED complaining of fever body aches chills nonproductive cough chest congestion with nausea and vomiting. Patient admits to avoid exposure and states daughter has same symptoms as she is experiencing now. In ED patient is hypertensive labs show elevated lactic acid, hypokalemia, leukocytosis. Chest x-ray unremarkable. CT abdomen pelvis reveals nonspecific colitis. Rapid COVID negative. COVID send out as patient admits to exposure. 7/06/2020 patient more hypertensive today with some tachycardia. Patient started on Cipro/Flagyl and Lopressor 25 twice daily. Stool cultures ordered. Awaiting COVID send out results. BP improving. Temp 100.1 with intermittent tachycardia. 7/07/2020 Pt with continued N/V and abdominal pain. Pt unable to keep food down. Pt afebrile today. 7/08/2020 Pt more alert and talkative today. Still with N/V/ abdominal pain. IV phenergan continued. Pt hyponatremia. Iv fluids switched to LR.    7/09/2020 Pt continues to have emesis with all intake. Pt admits to her stomach feeling \"full of air\". KUB ordered and shows \"moderate amount of gas in the stomach and large bowel, but no obstruction\".  Initiate Bentyl and schedule antiemetics 30min before meals. Review of Systems:   Review of Systems   Constitutional: Negative for chills, diaphoresis, fatigue and fever. HENT: Negative for congestion, ear pain, sinus pain, sore throat and trouble swallowing. Eyes: Negative for photophobia and visual disturbance. Respiratory: Negative for cough, shortness of breath, wheezing and stridor. Cardiovascular: Negative for chest pain, palpitations and leg swelling. Gastrointestinal: Positive for abdominal pain, nausea and vomiting. Negative for abdominal distention, constipation and diarrhea. Endocrine: Negative for cold intolerance and heat intolerance. Genitourinary: Negative for difficulty urinating, flank pain, frequency and urgency. Musculoskeletal: Negative for arthralgias and myalgias. Neurological: Negative for dizziness, syncope, weakness, light-headedness, numbness and headaches. Hematological: Does not bruise/bleed easily. Psychiatric/Behavioral: Negative for agitation, confusion and dysphoric mood. Objective:   VITALS:  BP (!) 170/89   Pulse 87   Temp 98.2 °F (36.8 °C)   Resp 16   Ht 5' 9\" (1.753 m)   Wt 148 lb 11.2 oz (67.4 kg)   SpO2 99%   BMI 21.96 kg/m²   24HR INTAKE/OUTPUT:      Intake/Output Summary (Last 24 hours) at 7/9/2020 0929  Last data filed at 7/9/2020 7051  Gross per 24 hour   Intake 2195 ml   Output 900 ml   Net 1295 ml       Physical Exam  Constitutional:       General: She is not in acute distress. Appearance: She is ill-appearing. She is not toxic-appearing or diaphoretic. HENT:      Head: Normocephalic and atraumatic. Right Ear: External ear normal.      Left Ear: External ear normal.      Nose: Nose normal. No congestion or rhinorrhea. Mouth/Throat:      Mouth: Mucous membranes are moist.      Pharynx: Oropharynx is clear. Eyes:      General: No scleral icterus. Extraocular Movements: Extraocular movements intact.       Conjunctiva/sclera: Conjunctivae normal.   Neck:      Musculoskeletal: Normal range of motion and neck supple. Cardiovascular:      Rate and Rhythm: Normal rate and regular rhythm. Pulses: Normal pulses. Heart sounds: Normal heart sounds. No murmur. No friction rub. No gallop. Pulmonary:      Effort: Pulmonary effort is normal. No respiratory distress. Breath sounds: Normal breath sounds. No stridor. No wheezing, rhonchi or rales. Abdominal:      General: Abdomen is flat. Bowel sounds are normal. There is no distension. Palpations: Abdomen is soft. Tenderness: There is abdominal tenderness. Musculoskeletal: Normal range of motion. General: No swelling. Right lower leg: No edema. Left lower leg: No edema. Skin:     General: Skin is warm. Coloration: Skin is not jaundiced. Findings: No erythema, lesion or rash. Neurological:      General: No focal deficit present. Mental Status: She is oriented to person, place, and time. Mental status is at baseline. Cranial Nerves: No cranial nerve deficit. Sensory: No sensory deficit. Motor: No weakness. Psychiatric:         Mood and Affect: Mood normal.         Behavior: Behavior normal.         Thought Content:  Thought content normal.         Judgment: Judgment normal.          Medications:      lactated ringers 150 mL/hr at 07/09/20 0147      metoprolol tartrate  50 mg Oral BID    buPROPion  100 mg Oral Daily    lamoTRIgine  100 mg Oral Daily    ciprofloxacin  400 mg Intravenous Q12H    metroNIDAZOLE  500 mg Intravenous Q8H    amLODIPine  10 mg Oral Daily    sertraline  50 mg Oral Daily    sodium chloride flush  10 mL Intravenous 2 times per day     promethazine, promethazine, ketorolac, hydrALAZINE, acetaminophen, sodium chloride flush, polyethylene glycol, promethazine **OR** ondansetron, potassium chloride **OR** potassium alternative oral replacement **OR** potassium chloride, magnesium sulfate  DIET CLEAR LIQUID;     Lab and other Data:     Recent Labs     07/07/20  0455 07/08/20  0206 07/09/20  0210   WBC 12.6* 13.9* 10.1   HGB 13.0 13.5 14.1    351 380     Recent Labs     07/07/20  0455 07/08/20  0206 07/09/20  0210   * 131* 136   K 3.4* 3.8 4.0   CL 98 96* 99   CO2 24 21* 21*   BUN 11 12 9   CREATININE 0.5 0.5 0.5   GLUCOSE 107 96 99     Recent Labs     07/07/20  0455 07/08/20  0206 07/09/20  0210   AST 18 15 16   ALT 17 16 13   BILITOT 0.3 0.4 0.4   ALKPHOS 93 94 89       RAD:   Ct Abdomen Pelvis W Iv Contrast Additional Contrast? None  Result Date: 7/5/2020  1. No acute inflammatory process seen in the abdomen or pelvis. Wall thickening of the colon may be artifactual due to underdistention. Nonspecific colitis considered. No bowel obstruction. No appendicitis. No free air or abscess. 2. Subcentimeter hypodense lesions within the superior liver likely incidental cysts. Subcentimeter superior left renal cyst. Distended bladder. Signed by Dr Urszula Drake on 7/5/2020 6:56 PM    Xr Chest Portable  Result Date: 7/5/2020  Findings: The lungs are well expanded and clear. The cardiomediastinal contours are normal. There is no pleural effusion or pneumothorax. No acute bony pathology is identified. 1. No acute radiographic cardiopulmonary process. Signed by Dr Urszula Drake on 7/5/2020 5:59 PM      XR ABDOMEN   7/09/2020  Impression:  There is moderate amount of gas the stomach and large bowel. No dilation of the small of large bowel loops. Both kidneys are obscured by the bowel contents.   Signed by Dr Mireya Teague on 7/9/2020 12:43 PM      Micro:  Culture, Stool [[id number]]    Order Status: No result Specimen: Stool      Rapid influenza A/B antigens [[id number]] Collected: 07/05/20 1425   Order Status: Completed Specimen: Nasopharyngeal Updated: 07/05/20 2505    Rapid Influenza A Ag Negative    Rapid Influenza B Ag Negative     Culture, Blood 1 [[id number]] Collected: 07/05/20 2145   Order Status: Completed Specimen: Blood Updated: 07/07/20 0514    Blood Culture, Routine No Growth to date.  Any change in status will be called. Assessment/Plan   Principal Problem:    Gastroenteritis-  clear liquids. Day #4 Cipro/Flagyl. Pt continues having emesis. KUB ordered and shows \"moderate amount of gas in the stomach and large bowel, but no obstruction\". Initiate Bentyl and schedule antiemetics 30min before meals.        Active Problems:   Hyponatremia- resolved. Dc LR     Lactic Acidosis- resolved. WBC now wnl.       Hypertensive disorder-Norvasc resumed, Lopressor 25 BID added.       Close exposure to COVID-19 virus- Covid rapid and PCR negative       Hypokalemia- resolved       Pina Streeter PA-C  7/9/2020, 9:29 AM

## 2020-07-09 NOTE — PLAN OF CARE
Problem: Falls - Risk of:  Goal: Will remain free from falls  Description: Will remain free from falls  7/8/2020 2341 by Lydia Ariza RN  Outcome: Ongoing  7/8/2020 2340 by Lydia Ariza RN  Outcome: Ongoing  Goal: Absence of physical injury  Description: Absence of physical injury  7/8/2020 2341 by Lydia Ariza RN  Outcome: Ongoing  7/8/2020 2340 by Lydia Ariza RN  Outcome: Ongoing     Problem: Fluid Volume - Imbalance:  Goal: Absence of imbalanced fluid volume signs and symptoms  Description: Absence of imbalanced fluid volume signs and symptoms  7/8/2020 2341 by Lydia Ariza RN  Outcome: Ongoing  7/8/2020 2340 by Lydia Ariza RN  Outcome: Ongoing     Problem: Pain:  Goal: Pain level will decrease  Description: Pain level will decrease  7/8/2020 2341 by Lydia Ariza RN  Outcome: Ongoing  7/8/2020 2340 by Lydia Ariza RN  Outcome: Ongoing  Goal: Control of acute pain  Description: Control of acute pain  7/8/2020 2341 by Lydia Ariza RN  Outcome: Ongoing  7/8/2020 2340 by Lydia Ariza RN  Outcome: Ongoing  Goal: Control of chronic pain  Description: Control of chronic pain  7/8/2020 2341 by Lydia Ariza RN  Outcome: Ongoing  7/8/2020 2340 by Lydia Ariza RN  Outcome: Ongoing

## 2020-07-10 VITALS
HEART RATE: 85 BPM | RESPIRATION RATE: 16 BRPM | BODY MASS INDEX: 22.02 KG/M2 | TEMPERATURE: 97.7 F | HEIGHT: 69 IN | WEIGHT: 148.7 LBS | SYSTOLIC BLOOD PRESSURE: 101 MMHG | DIASTOLIC BLOOD PRESSURE: 47 MMHG | OXYGEN SATURATION: 93 %

## 2020-07-10 LAB
ALBUMIN SERPL-MCNC: 4.4 G/DL (ref 3.5–5.2)
ALP BLD-CCNC: 83 U/L (ref 35–104)
ALT SERPL-CCNC: 10 U/L (ref 5–33)
ANION GAP SERPL CALCULATED.3IONS-SCNC: 16 MMOL/L (ref 7–19)
AST SERPL-CCNC: 19 U/L (ref 5–32)
BASOPHILS ABSOLUTE: 0.1 K/UL (ref 0–0.2)
BASOPHILS RELATIVE PERCENT: 0.8 % (ref 0–1)
BILIRUB SERPL-MCNC: 0.4 MG/DL (ref 0.2–1.2)
BUN BLDV-MCNC: 8 MG/DL (ref 6–20)
CALCIUM SERPL-MCNC: 9.2 MG/DL (ref 8.6–10)
CHLORIDE BLD-SCNC: 97 MMOL/L (ref 98–111)
CO2: 22 MMOL/L (ref 22–29)
CREAT SERPL-MCNC: 0.6 MG/DL (ref 0.5–0.9)
EOSINOPHILS ABSOLUTE: 0 K/UL (ref 0–0.6)
EOSINOPHILS RELATIVE PERCENT: 0.2 % (ref 0–5)
GFR NON-AFRICAN AMERICAN: >60
GLUCOSE BLD-MCNC: 103 MG/DL (ref 74–109)
HCT VFR BLD CALC: 45 % (ref 37–47)
HEMOGLOBIN: 14.6 G/DL (ref 12–16)
IMMATURE GRANULOCYTES #: 0.1 K/UL
LYMPHOCYTES ABSOLUTE: 2.6 K/UL (ref 1.1–4.5)
LYMPHOCYTES RELATIVE PERCENT: 25.2 % (ref 20–40)
MAGNESIUM: 2 MG/DL (ref 1.6–2.6)
MCH RBC QN AUTO: 26.9 PG (ref 27–31)
MCHC RBC AUTO-ENTMCNC: 32.4 G/DL (ref 33–37)
MCV RBC AUTO: 82.9 FL (ref 81–99)
MONOCYTES ABSOLUTE: 1 K/UL (ref 0–0.9)
MONOCYTES RELATIVE PERCENT: 9.3 % (ref 0–10)
NEUTROPHILS ABSOLUTE: 6.6 K/UL (ref 1.5–7.5)
NEUTROPHILS RELATIVE PERCENT: 64 % (ref 50–65)
PDW BLD-RTO: 14.7 % (ref 11.5–14.5)
PLATELET # BLD: 384 K/UL (ref 130–400)
PMV BLD AUTO: 10.1 FL (ref 9.4–12.3)
POTASSIUM SERPL-SCNC: 3.8 MMOL/L (ref 3.5–5)
RBC # BLD: 5.43 M/UL (ref 4.2–5.4)
SODIUM BLD-SCNC: 135 MMOL/L (ref 136–145)
TOTAL PROTEIN: 6.9 G/DL (ref 6.6–8.7)
WBC # BLD: 10.3 K/UL (ref 4.8–10.8)

## 2020-07-10 PROCEDURE — 6370000000 HC RX 637 (ALT 250 FOR IP): Performed by: HOSPITALIST

## 2020-07-10 PROCEDURE — 6360000002 HC RX W HCPCS: Performed by: INTERNAL MEDICINE

## 2020-07-10 PROCEDURE — 6360000002 HC RX W HCPCS: Performed by: PHYSICIAN ASSISTANT

## 2020-07-10 PROCEDURE — 80053 COMPREHEN METABOLIC PANEL: CPT

## 2020-07-10 PROCEDURE — 6370000000 HC RX 637 (ALT 250 FOR IP): Performed by: INTERNAL MEDICINE

## 2020-07-10 PROCEDURE — 85025 COMPLETE CBC W/AUTO DIFF WBC: CPT

## 2020-07-10 PROCEDURE — 6370000000 HC RX 637 (ALT 250 FOR IP): Performed by: PHYSICIAN ASSISTANT

## 2020-07-10 PROCEDURE — 2500000003 HC RX 250 WO HCPCS: Performed by: PHYSICIAN ASSISTANT

## 2020-07-10 PROCEDURE — 36415 COLL VENOUS BLD VENIPUNCTURE: CPT

## 2020-07-10 PROCEDURE — 83735 ASSAY OF MAGNESIUM: CPT

## 2020-07-10 PROCEDURE — 2580000003 HC RX 258: Performed by: PHYSICIAN ASSISTANT

## 2020-07-10 PROCEDURE — 6360000002 HC RX W HCPCS: Performed by: HOSPITALIST

## 2020-07-10 RX ORDER — ONDANSETRON 4 MG/1
4 TABLET, FILM COATED ORAL 3 TIMES DAILY
Qty: 45 TABLET | Refills: 0 | Status: SHIPPED | OUTPATIENT
Start: 2020-07-10

## 2020-07-10 RX ORDER — ISOSORBIDE MONONITRATE 60 MG/1
60 TABLET, EXTENDED RELEASE ORAL DAILY
Qty: 30 TABLET | Refills: 3 | Status: SHIPPED | OUTPATIENT
Start: 2020-07-11

## 2020-07-10 RX ORDER — METOPROLOL TARTRATE 50 MG/1
50 TABLET, FILM COATED ORAL 2 TIMES DAILY
Qty: 60 TABLET | Refills: 3 | Status: SHIPPED | OUTPATIENT
Start: 2020-07-10

## 2020-07-10 RX ORDER — SIMETHICONE 80 MG
80 TABLET,CHEWABLE ORAL 4 TIMES DAILY PRN
Qty: 180 TABLET | Refills: 3 | Status: SHIPPED | OUTPATIENT
Start: 2020-07-10

## 2020-07-10 RX ORDER — ISOSORBIDE MONONITRATE 60 MG/1
60 TABLET, EXTENDED RELEASE ORAL DAILY
Status: DISCONTINUED | OUTPATIENT
Start: 2020-07-10 | End: 2020-07-10 | Stop reason: HOSPADM

## 2020-07-10 RX ORDER — BUPROPION HYDROCHLORIDE 100 MG/1
100 TABLET, EXTENDED RELEASE ORAL DAILY
Qty: 60 TABLET | Refills: 0
Start: 2020-07-11

## 2020-07-10 RX ORDER — LAMOTRIGINE 100 MG/1
100 TABLET ORAL DAILY
Qty: 30 TABLET | Refills: 0
Start: 2020-07-11

## 2020-07-10 RX ADMIN — ONDANSETRON HYDROCHLORIDE 4 MG: 4 TABLET, FILM COATED ORAL at 06:26

## 2020-07-10 RX ADMIN — HYDRALAZINE HYDROCHLORIDE 10 MG: 20 INJECTION INTRAMUSCULAR; INTRAVENOUS at 07:48

## 2020-07-10 RX ADMIN — ISOSORBIDE MONONITRATE 60 MG: 60 TABLET, EXTENDED RELEASE ORAL at 07:48

## 2020-07-10 RX ADMIN — AMLODIPINE BESYLATE 10 MG: 10 TABLET ORAL at 07:48

## 2020-07-10 RX ADMIN — METRONIDAZOLE 500 MG: 500 INJECTION, SOLUTION INTRAVENOUS at 02:43

## 2020-07-10 RX ADMIN — ONDANSETRON 4 MG: 2 INJECTION INTRAMUSCULAR; INTRAVENOUS at 02:50

## 2020-07-10 RX ADMIN — SIMETHICONE CHEW TAB 80 MG 80 MG: 80 TABLET ORAL at 07:48

## 2020-07-10 RX ADMIN — LAMOTRIGINE 100 MG: 100 TABLET ORAL at 07:48

## 2020-07-10 RX ADMIN — Medication 10 ML: at 07:49

## 2020-07-10 RX ADMIN — KETOROLAC TROMETHAMINE 30 MG: 30 INJECTION, SOLUTION INTRAMUSCULAR at 07:49

## 2020-07-10 RX ADMIN — BUPROPION HYDROCHLORIDE 100 MG: 100 TABLET, FILM COATED, EXTENDED RELEASE ORAL at 07:48

## 2020-07-10 RX ADMIN — PROMETHAZINE HYDROCHLORIDE 25 MG: 25 INJECTION INTRAMUSCULAR; INTRAVENOUS at 11:52

## 2020-07-10 RX ADMIN — METOPROLOL TARTRATE 50 MG: 50 TABLET, FILM COATED ORAL at 07:48

## 2020-07-10 ASSESSMENT — PAIN SCALES - GENERAL: PAINLEVEL_OUTOF10: 6

## 2020-07-10 NOTE — DISCHARGE SUMMARY
Discharge Summary      Date:7/10/2020        Patient Adam Garner     YOB: 1985     Age:35 y.o. Admit Date:7/5/2020   Admission Condition:fair   Discharged Condition:fair  Discharge Date: 07/10/20       Discharge Diagnoses   Principal Problem:    Gastroenteritis  Active Problems:    Hypertensive disorder    Close exposure to COVID-19 virus    Gastroenteritis, infectious  Resolved Problems:    * No resolved hospital problems. [facility] Stay   Narrative of Hospital Course:       46-year-old old lady with a history of underlying psychiatric disorder, presented to the ER with concerns of generalized body aches, fever, nausea and emesis. Patient was noted to be hypertensive in the ED, CT abdomen and pelvis revealed nonspecific colitis. Patient had recent exposure to viral infection from family members prior to initiation of symptoms. Was tested for COVID twice in the ED which were negative, patient was treated for gastroenteritis in-house. Patient completed 4 days course of ciprofloxacin and Flagyl in-house with no improvement in symptoms hence antibiotics were discontinued as patient next likely with bacteria gastroenteritis however highly suspicious for viral source. KUB was obtained in-house due to persistence emesis which was negative for any obstruction or ileus. Patient was put on scheduled Zofran as well as simethicone with improvement in symptoms. Patient was encouraged to have a lot of rest at home, and remain hydrated. Patient noted to be persistently hypertensive in-house, she carries a diagnosis of hypertension, Imdur as well as metoprolol was initiated to her regimen with continuation of prior Norvasc. To follow-up with PCP in about 5 to 7 days for reevaluation and continued management of chronic medical problems. Physical examination  General: No acute distress lying comfortably in bed.   HEENT: Atraumatic normocephalic  Cardiac: Normal S1-S2 no murmurs rub or gallop. Respiratory: clear To auscultation bilaterally, no rhonchi or rales  Abdomen: nontender to palpation, no organomegaly noted. Extremities: no tenderness, no edema, moves all extremities  Psych: Affect normal and good eye contact        Consultants:   PALLIATIVE CARE EVAL    Time Spent on Discharge:  >30 minutes were spent in patient examination, evaluation, counseling as well as medication reconciliation, prescriptions for required medications, discharge plan and follow up. Surgeries/Procedures Performed:  NONE    Significant Diagnostic Studies:   Recent Labs:  CBC:   Lab Results   Component Value Date    WBC 10.3 07/10/2020    RBC 5.43 07/10/2020    HGB 14.6 07/10/2020    HCT 45.0 07/10/2020    MCV 82.9 07/10/2020    MCH 26.9 07/10/2020    MCHC 32.4 07/10/2020    RDW 14.7 07/10/2020     07/10/2020     BMP:    Lab Results   Component Value Date    GLUCOSE 103 07/10/2020     07/10/2020    K 3.8 07/10/2020    K 4.1 07/06/2020    CL 97 07/10/2020    CO2 22 07/10/2020    ANIONGAP 16 07/10/2020    BUN 8 07/10/2020    CREATININE 0.6 07/10/2020    CALCIUM 9.2 07/10/2020    LABGLOM >60 07/10/2020       Radiology Last 7 Days:  Xr Abdomen (kub) (single Ap View)    Result Date: 7/9/2020  Unremarkable abdomen. No evidence of obstruction or ileus. Signed by Dr Nathalie Fernandez on 7/9/2020 12:43 PM    Ct Abdomen Pelvis W Iv Contrast Additional Contrast? None    Result Date: 7/5/2020  1. No acute inflammatory process seen in the abdomen or pelvis. Wall thickening of the colon may be artifactual due to underdistention. Nonspecific colitis considered. No bowel obstruction. No appendicitis. No free air or abscess. 2. Subcentimeter hypodense lesions within the superior liver likely incidental cysts. Subcentimeter superior left renal cyst. Distended bladder. Signed by Dr Klaudia Munoz on 7/5/2020 6:56 PM    Xr Chest Portable    Result Date: 7/5/2020  1. No acute radiographic cardiopulmonary process.  Signed by Dr Itz Walsh on 7/5/2020 5:59 PM    Cta Pulmonary W Contrast    Result Date: 7/6/2020  1. No pulmonary embolism. 2. No acute lung disease. Signed by Dr Christopher Escobedo on 7/6/2020 5:35 PM      Discharge Plan   Disposition: Home    Provider Follow-Up:   Lashonda Reina MD  [address] [place] [id number] [phone]            Patient Instructions   Diet: clear liquids    Activity: activity as tolerated      Discharge Medications         Medication List      START taking these medications    buPROPion 100 MG extended release tablet  Commonly known as:  WELLBUTRIN SR  Take 1 tablet by mouth daily  Start taking on:  July 11, 2020     isosorbide mononitrate 60 MG extended release tablet  Commonly known as:  IMDUR  Take 1 tablet by mouth daily This medication is for your blood pressure. Take in addition to amlodipine. Start taking on:  July 11, 2020     lamoTRIgine 100 MG tablet  Commonly known as:  LAMICTAL  Take 1 tablet by mouth daily  Start taking on:  July 11, 2020     metoprolol tartrate 50 MG tablet  Commonly known as:  LOPRESSOR  Take 1 tablet by mouth 2 times daily     simethicone 80 MG chewable tablet  Commonly known as:  MYLICON  Take 1 tablet by mouth 4 times daily as needed for Flatulence        CHANGE how you take these medications    ondansetron 4 MG tablet  Commonly known as:  ZOFRAN  Take 1 tablet by mouth 3 times daily Take 30mins - 1hr before meals.   What changed:    · when to take this  · reasons to take this  · additional instructions        CONTINUE taking these medications    amLODIPine 10 MG tablet  Commonly known as:  NORVASC  Take 1 tablet by mouth daily     pantoprazole 40 MG tablet  Commonly known as:  PROTONIX  Take 1 tablet by mouth every morning (before breakfast) for 14 days        STOP taking these medications    sertraline 50 MG tablet  Commonly known as:  ZOLOFT           Where to Get Your Medications      These medications were sent to [address] [place] [address] [phone] [facility] [phone]  [address] [place] [address]    Phone:  [phone]   · isosorbide mononitrate 60 MG extended release tablet  · metoprolol tartrate 50 MG tablet  · ondansetron 4 MG tablet  · simethicone 80 MG chewable tablet     Information about where to get these medications is not yet available    Ask your nurse or doctor about these medications  · buPROPion 100 MG extended release tablet  · lamoTRIgine 100 MG tablet         Electronically signed by Tyesha Garner MD on 7/10/20 at 10:21 AM CDT

## 2020-07-10 NOTE — PLAN OF CARE
Problem: Falls - Risk of:  Goal: Will remain free from falls  Description: Will remain free from falls  7/10/2020 0124 by Gonzalo Min RN  Outcome: Ongoing  7/9/2020 1212 by Isamar Hastings RN  Outcome: Ongoing  Goal: Absence of physical injury  Description: Absence of physical injury  7/10/2020 0124 by Gonzalo Min RN  Outcome: Ongoing  7/9/2020 1212 by Isamar Hastings RN  Outcome: Ongoing     Problem: Fluid Volume - Imbalance:  Goal: Absence of imbalanced fluid volume signs and symptoms  Description: Absence of imbalanced fluid volume signs and symptoms  7/10/2020 0124 by Gonzalo Min RN  Outcome: Ongoing  7/9/2020 1212 by Isamar Hastings RN  Outcome: Ongoing     Problem: Pain:  Goal: Pain level will decrease  Description: Pain level will decrease  7/10/2020 0124 by Gonzalo Min RN  Outcome: Ongoing  7/9/2020 1212 by Isamar Hastings RN  Outcome: Ongoing  Goal: Control of acute pain  Description: Control of acute pain  7/10/2020 0124 by Gonzalo Min RN  Outcome: Ongoing  7/9/2020 1212 by Isamar Hastings RN  Outcome: Ongoing  Goal: Control of chronic pain  Description: Control of chronic pain  7/10/2020 0124 by Gonzalo Min RN  Outcome: Ongoing  7/9/2020 1212 by Isamar Hastings RN  Outcome: Ongoing

## 2020-07-11 LAB — BLOOD CULTURE, ROUTINE: NORMAL

## 2020-11-22 ENCOUNTER — HOSPITAL ENCOUNTER (EMERGENCY)
Age: 35
Discharge: HOME | End: 2020-11-22
Payer: COMMERCIAL

## 2020-11-22 VITALS
OXYGEN SATURATION: 97 % | RESPIRATION RATE: 21 BRPM | TEMPERATURE: 98.7 F | HEIGHT: 63 IN | DIASTOLIC BLOOD PRESSURE: 87 MMHG | BODY MASS INDEX: 28.35 KG/M2 | WEIGHT: 160 LBS | HEART RATE: 89 BPM | SYSTOLIC BLOOD PRESSURE: 157 MMHG

## 2020-11-22 PROCEDURE — 6360000002 HC RX W HCPCS: Performed by: NURSE PRACTITIONER

## 2020-11-22 PROCEDURE — 99285 EMERGENCY DEPT VISIT HI MDM: CPT

## 2020-11-22 PROCEDURE — 96372 THER/PROPH/DIAG INJ SC/IM: CPT

## 2020-11-22 RX ORDER — METHOCARBAMOL 500 MG/1
500 TABLET, FILM COATED ORAL 3 TIMES DAILY
Qty: 20 TABLET | Refills: 0 | Status: SHIPPED | OUTPATIENT
Start: 2020-11-22 | End: 2020-11-29

## 2020-11-22 RX ORDER — MORPHINE SULFATE 4 MG/ML
4 INJECTION, SOLUTION INTRAMUSCULAR; INTRAVENOUS ONCE
Status: COMPLETED | OUTPATIENT
Start: 2020-11-22 | End: 2020-11-22

## 2020-11-22 RX ORDER — ORPHENADRINE CITRATE 30 MG/ML
60 INJECTION INTRAMUSCULAR; INTRAVENOUS ONCE
Status: COMPLETED | OUTPATIENT
Start: 2020-11-22 | End: 2020-11-22

## 2020-11-22 RX ADMIN — MORPHINE SULFATE 4 MG: 4 INJECTION, SOLUTION INTRAMUSCULAR; INTRAVENOUS at 10:39

## 2020-11-22 RX ADMIN — ORPHENADRINE CITRATE 60 MG: 30 INJECTION INTRAMUSCULAR; INTRAVENOUS at 10:39

## 2020-11-22 ASSESSMENT — PAIN SCALES - GENERAL
PAINLEVEL_OUTOF10: 0
PAINLEVEL_OUTOF10: 10
PAINLEVEL_OUTOF10: 10

## 2020-11-22 ASSESSMENT — ENCOUNTER SYMPTOMS
SORE THROAT: 0
ABDOMINAL PAIN: 0

## 2020-11-22 NOTE — ED PROVIDER NOTES
[facility]  eMERGENCY dEPARTMENT eNCOUnter      Pt Name: Jalyn Mcrae  MRN: [id number]  [place] 1985  Date of evaluation: 2020  Provider: Phoebe Nino, [address]       Chief Complaint   Patient presents with    Neck Pain     muscle spasms in neck started yesterday morning (hx of spasms for 10 years)         HISTORY OF PRESENT ILLNESS   (Location/Symptom, Timing/Onset,Context/Setting, Quality, Duration, Modifying Factors, Severity)  Note limiting factors. Jasson Blunt a 37 y.o. female who presents to the emergency department for evaluation of neck pain. Pt tells me that she has chronic neck pain with worsening right sided neck pain with reported neck muscle spasm that began yesterday upon awakening from sleep. She has had no fevers, cough or breathing difficulty. She denies direct injury to her neck as well as lateralizing numbness/weakness. She tells me that she has had similar problems with her neck for \"years\" and follows with Dr. Christy Ocampo. HPI    Nursing Notes were reviewed. REVIEW OF SYSTEMS    (2-9 systems for level 4, 10 or more for level 5)     Review of Systems   Constitutional: Negative. Negative for fever. HENT: Negative for sore throat. Cardiovascular: Negative for chest pain. Gastrointestinal: Negative for abdominal pain. Musculoskeletal: Positive for myalgias (right side of neck) and neck pain. A complete review of systems was performed and is negative except as noted above in the HPI.        PAST MEDICAL HISTORY     Past Medical History:   Diagnosis Date    Depression 2019    Hypertensive disorder 2019         SURGICAL HISTORY       Past Surgical History:   Procedure Laterality Date     SECTION           CURRENT MEDICATIONS       Discharge Medication List as of 2020 11:20 AM      CONTINUE these medications which have NOT CHANGED    Details   isosorbide mononitrate (IMDUR) 60 MG extended release tablet Take 1 tablet by mouth daily This medication is for your blood pressure. Take in addition to amlodipine., Disp-30 tablet, R-3Normal      lamoTRIgine (LAMICTAL) 100 MG tablet Take 1 tablet by mouth daily, Disp-30 tablet, R-0NO PRINT      buPROPion (WELLBUTRIN SR) 100 MG extended release tablet Take 1 tablet by mouth daily, Disp-60 tablet, R-0NO PRINT      metoprolol tartrate (LOPRESSOR) 50 MG tablet Take 1 tablet by mouth 2 times daily, Disp-60 tablet, R-3Normal      simethicone (MYLICON) 80 MG chewable tablet Take 1 tablet by mouth 4 times daily as needed for Flatulence, Disp-180 tablet, R-3Normal      ondansetron (ZOFRAN) 4 MG tablet Take 1 tablet by mouth 3 times daily Take 30mins - 1hr before meals. , Disp-45 tablet, R-0Normal      amLODIPine (NORVASC) 10 MG tablet Take 1 tablet by mouth daily, Disp-30 tablet, R-3Normal      pantoprazole (PROTONIX) 40 MG tablet Take 1 tablet by mouth every morning (before breakfast) for 14 days, Disp-14 tablet, R-0Normal             ALLERGIES     Bee venom    FAMILY HISTORY       Family History   Problem Relation Age of Onset    Hypertension Paternal Grandfather     Diabetes Paternal Grandfather     Cancer Paternal Grandmother     Hypertension Paternal Grandmother     Diabetes Paternal Grandmother     Diabetes Maternal Grandmother     Hypertension Maternal Grandmother     Hypertension Maternal Grandfather     Diabetes Maternal Grandfather     Diabetes Father     Eclampsia Father     Diabetes Mother     Eclampsia Mother           SOCIAL HISTORY       Social History     Socioeconomic History    Marital status: Single     Spouse name: None    Number of children: None    Years of education: None    Highest education level: None   Occupational History    None   Social Needs    Financial resource strain: None    Food insecurity     Worry: None     Inability: None    Transportation needs     Medical: None     Non-medical: None   Tobacco Use    Smoking status: Former Smoker  Smokeless tobacco: Never Used   Substance and Sexual Activity    Alcohol use: No    Drug use: No    Sexual activity: Never   Lifestyle    Physical activity     Days per week: None     Minutes per session: None    Stress: None   Relationships    Social connections     Talks on phone: None     Gets together: None     Attends Mosque service: None     Active member of club or organization: None     Attends meetings of clubs or organizations: None     Relationship status: None    Intimate partner violence     Fear of current or ex partner: None     Emotionally abused: None     Physically abused: None     Forced sexual activity: None   Other Topics Concern    None   Social History Narrative    None       SCREENINGS    Raymond Coma Scale  Eye Opening: Spontaneous  Best Verbal Response: Oriented  Best Motor Response: Obeys commands  Antonio Coma Scale Score: 15        PHYSICAL EXAM    (up to 7 for level 4, 8 or more for level 5)     ED Triage Vitals [11/22/20 0922]   BP Temp Temp src Pulse Resp SpO2 Height Weight   (!) 161/102 99 °F (37.2 °C) -- 97 17 94 % 5' 3\" (1.6 m) 160 lb (72.6 kg)       Physical Exam  Vitals signs reviewed. HENT:      Head: Normocephalic. Right Ear: External ear normal.      Left Ear: External ear normal.   Eyes:      Conjunctiva/sclera: Conjunctivae normal.      Pupils: Pupils are equal, round, and reactive to light. Neck:      Musculoskeletal: Normal range of motion. Comments: No meningismus  No direct ttp cervical spine  Mild ttp right posterior lateral neck  Cardiovascular:      Rate and Rhythm: Normal rate and regular rhythm. Heart sounds: Normal heart sounds. Pulmonary:      Effort: Pulmonary effort is normal.      Breath sounds: Normal breath sounds. Abdominal:      General: Bowel sounds are normal.      Palpations: Abdomen is soft. Musculoskeletal: Normal range of motion.       Comments: 5/5 MS equal bilateral upper extremities at shoulders/elbows/wrist  CMS intact bilateral upper extremities   Skin:     General: Skin is warm and dry. Neurological:      Mental Status: She is alert and oriented to person, place, and time. DIAGNOSTIC RESULTS     EKG: All EKG's are interpreted by the Emergency Department Physician who either signs or Co-signs this chart in the absence of acardiologist.        RADIOLOGY:   Non-plain film images such as CT, Ultrasound andMRI are read by the radiologist. Plain radiographic images are visualized and preliminarily interpreted by the emergency physician with the below findings:        Interpretation per the Radiologist below, if available at the time of this note:    No orders to display         ED BEDSIDE ULTRASOUND:   Performed by ED Physician - none    LABS:  Labs Reviewed - No data to display    All other labs were within normal range or not returned as of this dictation. RE-ASSESSMENT           EMERGENCY DEPARTMENT COURSE and DIFFERENTIALDIAGNOSIS/MDM:   Vitals:    Vitals:    11/22/20 0922 11/22/20 1235   BP: (!) 161/102 (!) 157/87   Pulse: 97 89   Resp: 17 21   Temp: 99 °F (37.2 °C) 98.7 °F (37.1 °C)   TempSrc:  Oral   SpO2: 94% 97%   Weight: 160 lb (72.6 kg)    Height: 5' 3\" (1.6 m)        MDM      CONSULTS:  None    PROCEDURES:  Unless otherwise notedbelow, none     Procedures    FINAL IMPRESSION     1. Neck pain    2.  Elevated blood pressure reading          DISPOSITION/PLAN   DISPOSITION Decision To Discharge 11/22/2020 11:18:41 AM      PATIENT REFERRED TO:  Flaco Reyez MD  [address] [id number]  [phone]    Schedule an appointment as soon as possible for a visit   recheck blood pressure      DISCHARGE MEDICATIONS:       Discharge Medication List as of 11/22/2020 11:20 AM           Medication List      START taking these medications    methocarbamol 500 MG tablet  Commonly known as:  Robaxin  Take 1 tablet by mouth 3 times daily for 7 days        ASK your doctor about these medications    amLODIPine 10 MG tablet  Commonly known as:  NORVASC  Take 1 tablet by mouth daily     buPROPion 100 MG extended release tablet  Commonly known as:  WELLBUTRIN SR  Take 1 tablet by mouth daily     isosorbide mononitrate 60 MG extended release tablet  Commonly known as:  IMDUR  Take 1 tablet by mouth daily This medication is for your blood pressure. Take in addition to amlodipine. lamoTRIgine 100 MG tablet  Commonly known as:  LAMICTAL  Take 1 tablet by mouth daily     metoprolol tartrate 50 MG tablet  Commonly known as:  LOPRESSOR  Take 1 tablet by mouth 2 times daily     ondansetron 4 MG tablet  Commonly known as:  ZOFRAN  Take 1 tablet by mouth 3 times daily Take 30mins - 1hr before meals.      pantoprazole 40 MG tablet  Commonly known as:  PROTONIX  Take 1 tablet by mouth every morning (before breakfast) for 14 days     simethicone 80 MG chewable tablet  Commonly known as:  MYLICON  Take 1 tablet by mouth 4 times daily as needed for Flatulence           Where to Get Your Medications      You can get these medications from any pharmacy    Bring a paper prescription for each of these medications  · methocarbamol 500 MG tablet         (Pleasenote that portions of this note were completed with a voice recognition program.  Efforts were made to edit the dictations but occasionally words are mis-transcribed.)              Matthew Galo, APRN  11/22/20 7144

## 2020-11-22 NOTE — ED NOTES
Bed: 10  Expected date:   Expected time:   Means of arrival:   Comments:  Open at [address] RN  11/22/20 9502

## 2020-11-22 NOTE — ED NOTES
Bed: 16  Expected date:   Expected time:   Means of arrival:   Comments:  Open at [address], RN  11/22/20 3976

## 2021-09-13 PROCEDURE — 87491 CHLMYD TRACH DNA AMP PROBE: CPT | Performed by: NURSE PRACTITIONER

## 2021-09-13 PROCEDURE — 87591 N.GONORRHOEAE DNA AMP PROB: CPT | Performed by: NURSE PRACTITIONER

## 2021-09-13 PROCEDURE — 87661 TRICHOMONAS VAGINALIS AMPLIF: CPT | Performed by: NURSE PRACTITIONER

## 2021-09-13 PROCEDURE — U0004 COV-19 TEST NON-CDC HGH THRU: HCPCS | Performed by: NURSE PRACTITIONER

## 2021-12-09 PROCEDURE — U0004 COV-19 TEST NON-CDC HGH THRU: HCPCS | Performed by: NURSE PRACTITIONER

## 2023-04-20 ENCOUNTER — APPOINTMENT (OUTPATIENT)
Dept: GENERAL RADIOLOGY | Age: 38
End: 2023-04-20
Payer: COMMERCIAL

## 2023-04-20 ENCOUNTER — HOSPITAL ENCOUNTER (EMERGENCY)
Age: 38
Discharge: HOME | End: 2023-04-20
Payer: COMMERCIAL

## 2023-04-20 VITALS
WEIGHT: 165 LBS | HEART RATE: 89 BPM | SYSTOLIC BLOOD PRESSURE: 146 MMHG | DIASTOLIC BLOOD PRESSURE: 82 MMHG | OXYGEN SATURATION: 100 % | BODY MASS INDEX: 29.23 KG/M2 | RESPIRATION RATE: 17 BRPM | HEIGHT: 63 IN | TEMPERATURE: 98.1 F

## 2023-04-20 DIAGNOSIS — U07.1 COVID-19: Primary | ICD-10-CM

## 2023-04-20 DIAGNOSIS — H65.02 ACUTE SEROUS OTITIS MEDIA OF LEFT EAR, RECURRENCE NOT SPECIFIED: ICD-10-CM

## 2023-04-20 LAB
B PARAP IS1001 DNA NPH QL NAA+NON-PROBE: NOT DETECTED
B PERT.PT PRMT NPH QL NAA+NON-PROBE: NOT DETECTED
C PNEUM DNA NPH QL NAA+NON-PROBE: NOT DETECTED
FLUAV RNA NPH QL NAA+NON-PROBE: NOT DETECTED
FLUBV RNA NPH QL NAA+NON-PROBE: NOT DETECTED
HADV DNA NPH QL NAA+NON-PROBE: NOT DETECTED
HCOV 229E RNA NPH QL NAA+NON-PROBE: NOT DETECTED
HCOV HKU1 RNA NPH QL NAA+NON-PROBE: NOT DETECTED
HCOV NL63 RNA NPH QL NAA+NON-PROBE: NOT DETECTED
HCOV OC43 RNA NPH QL NAA+NON-PROBE: NOT DETECTED
HMPV RNA NPH QL NAA+NON-PROBE: NOT DETECTED
HPIV1 RNA NPH QL NAA+NON-PROBE: NOT DETECTED
HPIV2 RNA NPH QL NAA+NON-PROBE: NOT DETECTED
HPIV3 RNA NPH QL NAA+NON-PROBE: NOT DETECTED
HPIV4 RNA NPH QL NAA+NON-PROBE: NOT DETECTED
M PNEUMO DNA NPH QL NAA+NON-PROBE: NOT DETECTED
RSV RNA NPH QL NAA+NON-PROBE: NOT DETECTED
RV+EV RNA NPH QL NAA+NON-PROBE: NOT DETECTED
SARS-COV-2 RNA NPH QL NAA+NON-PROBE: DETECTED

## 2023-04-20 PROCEDURE — 96372 THER/PROPH/DIAG INJ SC/IM: CPT

## 2023-04-20 PROCEDURE — 71045 X-RAY EXAM CHEST 1 VIEW: CPT

## 2023-04-20 PROCEDURE — 99284 EMERGENCY DEPT VISIT MOD MDM: CPT

## 2023-04-20 PROCEDURE — 0202U NFCT DS 22 TRGT SARS-COV-2: CPT

## 2023-04-20 PROCEDURE — 6360000002 HC RX W HCPCS: Performed by: PHYSICIAN ASSISTANT

## 2023-04-20 RX ORDER — PREDNISONE 10 MG/1
10 TABLET ORAL DAILY
Qty: 10 TABLET | Refills: 0 | Status: SHIPPED | OUTPATIENT
Start: 2023-04-20 | End: 2023-04-30

## 2023-04-20 RX ORDER — KETOROLAC TROMETHAMINE 30 MG/ML
30 INJECTION, SOLUTION INTRAMUSCULAR; INTRAVENOUS ONCE
Status: COMPLETED | OUTPATIENT
Start: 2023-04-20 | End: 2023-04-20

## 2023-04-20 RX ORDER — DEXAMETHASONE SODIUM PHOSPHATE 4 MG/ML
4 INJECTION, SOLUTION INTRA-ARTICULAR; INTRALESIONAL; INTRAMUSCULAR; INTRAVENOUS; SOFT TISSUE ONCE
Status: COMPLETED | OUTPATIENT
Start: 2023-04-20 | End: 2023-04-20

## 2023-04-20 RX ORDER — AMOXICILLIN AND CLAVULANATE POTASSIUM 875; 125 MG/1; MG/1
1 TABLET, FILM COATED ORAL 2 TIMES DAILY
Qty: 20 TABLET | Refills: 0 | Status: SHIPPED | OUTPATIENT
Start: 2023-04-20 | End: 2023-04-30

## 2023-04-20 RX ORDER — CYCLOBENZAPRINE HCL 5 MG
5 TABLET ORAL NIGHTLY PRN
Qty: 30 TABLET | Refills: 0 | Status: SHIPPED | OUTPATIENT
Start: 2023-04-20 | End: 2023-04-30

## 2023-04-20 RX ORDER — ORPHENADRINE CITRATE 30 MG/ML
60 INJECTION INTRAMUSCULAR; INTRAVENOUS ONCE
Status: COMPLETED | OUTPATIENT
Start: 2023-04-20 | End: 2023-04-20

## 2023-04-20 RX ADMIN — ORPHENADRINE CITRATE 60 MG: 30 INJECTION INTRAMUSCULAR; INTRAVENOUS at 11:02

## 2023-04-20 RX ADMIN — KETOROLAC TROMETHAMINE 30 MG: 30 INJECTION, SOLUTION INTRAMUSCULAR; INTRAVENOUS at 12:15

## 2023-04-20 RX ADMIN — DEXAMETHASONE SODIUM PHOSPHATE 4 MG: 4 INJECTION, SOLUTION INTRAMUSCULAR; INTRAVENOUS at 11:02

## 2023-04-20 ASSESSMENT — ENCOUNTER SYMPTOMS
RHINORRHEA: 1
SHORTNESS OF BREATH: 0
ABDOMINAL DISTENTION: 0
EYE PAIN: 0
APNEA: 0
COUGH: 1
ABDOMINAL PAIN: 0
SORE THROAT: 0
COLOR CHANGE: 0
BACK PAIN: 0
PHOTOPHOBIA: 0
EYE DISCHARGE: 0
NAUSEA: 0

## 2023-04-20 ASSESSMENT — PAIN - FUNCTIONAL ASSESSMENT: PAIN_FUNCTIONAL_ASSESSMENT: 0-10

## 2023-04-20 ASSESSMENT — PAIN DESCRIPTION - FREQUENCY: FREQUENCY: CONTINUOUS

## 2023-04-20 ASSESSMENT — PAIN DESCRIPTION - DESCRIPTORS: DESCRIPTORS: ACHING;SPASM

## 2023-04-20 ASSESSMENT — PAIN SCALES - GENERAL
PAINLEVEL_OUTOF10: 7
PAINLEVEL_OUTOF10: 7

## 2023-04-20 ASSESSMENT — PAIN DESCRIPTION - ORIENTATION: ORIENTATION: LOWER

## 2023-04-20 ASSESSMENT — PAIN DESCRIPTION - LOCATION: LOCATION: NECK;BACK

## 2023-04-20 NOTE — ED PROVIDER NOTES
Mother     Eclampsia Mother           SOCIAL HISTORY       Social History     Socioeconomic History    Marital status: Single   Tobacco Use    Smoking status: Former    Smokeless tobacco: Never   Vaping Use    Vaping Use: Never used   Substance and Sexual Activity    Alcohol use: No    Drug use: No    Sexual activity: Never       SCREENINGS    Antonio Coma Scale  Eye Opening: Spontaneous  Best Verbal Response: Oriented  Best Motor Response: Obeys commands  Antonio Coma Scale Score: 15      PHYSICAL EXAM    (up to 7 forlevel 4, 8 or more for level 5)     ED Triage Vitals [04/20/23 0959]   BP Temp Temp src Heart Rate Resp SpO2 Height Weight   (!) 174/104 98.1 °F (36.7 °C) -- 97 19 100 % 5' 3\" (1.6 m) 165 lb (74.8 kg)       Physical Exam  Vitals and nursing note reviewed. Constitutional:       General: She is not in acute distress. Appearance: Normal appearance. She is well-developed. She is not diaphoretic. HENT:      Head: Normocephalic and atraumatic. Right Ear: Tympanic membrane, ear canal and external ear normal.      Left Ear: Ear canal and external ear normal.      Ears:      Comments: TM erythema no purulent findings L     Nose: Nose normal.      Mouth/Throat:      Mouth: Mucous membranes are moist.      Pharynx: No oropharyngeal exudate. Eyes:      General:         Right eye: No discharge. Left eye: No discharge. Pupils: Pupils are equal, round, and reactive to light. Neck:      Thyroid: No thyromegaly. Cardiovascular:      Rate and Rhythm: Normal rate and regular rhythm. Pulses: Normal pulses. Heart sounds: Normal heart sounds. No murmur heard. No friction rub. Pulmonary:      Effort: Pulmonary effort is normal. No respiratory distress. Breath sounds: Normal breath sounds. No stridor. No wheezing. Abdominal:      General: Abdomen is flat. Bowel sounds are normal. There is no distension. Palpations: Abdomen is soft. Tenderness:  There is no

## 2025-02-26 ENCOUNTER — HOSPITAL ENCOUNTER (EMERGENCY)
Facility: HOSPITAL | Age: 40
Discharge: HOME | End: 2025-02-27
Attending: STUDENT IN AN ORGANIZED HEALTH CARE EDUCATION/TRAINING PROGRAM
Payer: COMMERCIAL

## 2025-02-26 ENCOUNTER — APPOINTMENT (OUTPATIENT)
Dept: CT IMAGING | Facility: HOSPITAL | Age: 40
End: 2025-02-26
Payer: COMMERCIAL

## 2025-02-26 DIAGNOSIS — Z91.199 MEDICALLY NONCOMPLIANT: ICD-10-CM

## 2025-02-26 DIAGNOSIS — F15.10 METHAMPHETAMINE ABUSE: ICD-10-CM

## 2025-02-26 DIAGNOSIS — Z98.891 HISTORY OF CESAREAN SECTION: ICD-10-CM

## 2025-02-26 DIAGNOSIS — R10.84 ACUTE GENERALIZED ABDOMINAL PAIN: Primary | ICD-10-CM

## 2025-02-26 DIAGNOSIS — R11.2 NAUSEA AND VOMITING, UNSPECIFIED VOMITING TYPE: ICD-10-CM

## 2025-02-26 DIAGNOSIS — I10 CHRONIC HYPERTENSION: ICD-10-CM

## 2025-02-26 DIAGNOSIS — F12.90 MARIJUANA USE: ICD-10-CM

## 2025-02-26 LAB
ALBUMIN SERPL-MCNC: 4.7 G/DL (ref 3.5–5.2)
ALBUMIN/GLOB SERPL: 1.3 G/DL
ALP SERPL-CCNC: 119 U/L (ref 39–117)
ALT SERPL W P-5'-P-CCNC: 13 U/L (ref 1–33)
AMPHET+METHAMPHET UR QL: POSITIVE
AMPHETAMINES UR QL: POSITIVE
ANION GAP SERPL CALCULATED.3IONS-SCNC: 19 MMOL/L (ref 5–15)
AST SERPL-CCNC: 20 U/L (ref 1–32)
BACTERIA UR QL AUTO: NORMAL /HPF
BARBITURATES UR QL SCN: NEGATIVE
BASOPHILS # BLD AUTO: 0.02 10*3/MM3 (ref 0–0.2)
BASOPHILS NFR BLD AUTO: 0.2 % (ref 0–1.5)
BENZODIAZ UR QL SCN: NEGATIVE
BILIRUB SERPL-MCNC: 0.4 MG/DL (ref 0–1.2)
BILIRUB UR QL STRIP: NEGATIVE
BUN SERPL-MCNC: 6 MG/DL (ref 6–20)
BUN/CREAT SERPL: 10.7 (ref 7–25)
BUPRENORPHINE SERPL-MCNC: NEGATIVE NG/ML
CALCIUM SPEC-SCNC: 9.7 MG/DL (ref 8.6–10.5)
CANNABINOIDS SERPL QL: POSITIVE
CHLORIDE SERPL-SCNC: 97 MMOL/L (ref 98–107)
CLARITY UR: CLEAR
CO2 SERPL-SCNC: 21 MMOL/L (ref 22–29)
COCAINE UR QL: NEGATIVE
COLOR UR: YELLOW
CREAT SERPL-MCNC: 0.56 MG/DL (ref 0.57–1)
D-LACTATE SERPL-SCNC: 2.3 MMOL/L (ref 0.5–2)
D-LACTATE SERPL-SCNC: 3.3 MMOL/L (ref 0.5–2)
DEPRECATED RDW RBC AUTO: 48.7 FL (ref 37–54)
EGFRCR SERPLBLD CKD-EPI 2021: 120.7 ML/MIN/1.73
EOSINOPHIL # BLD AUTO: 0.01 10*3/MM3 (ref 0–0.4)
EOSINOPHIL NFR BLD AUTO: 0.1 % (ref 0.3–6.2)
ERYTHROCYTE [DISTWIDTH] IN BLOOD BY AUTOMATED COUNT: 16.1 % (ref 12.3–15.4)
FENTANYL UR-MCNC: NEGATIVE NG/ML
GLOBULIN UR ELPH-MCNC: 3.5 GM/DL
GLUCOSE SERPL-MCNC: 137 MG/DL (ref 65–99)
GLUCOSE UR STRIP-MCNC: ABNORMAL MG/DL
HCG SERPL QL: NEGATIVE
HCT VFR BLD AUTO: 40.9 % (ref 34–46.6)
HGB BLD-MCNC: 12.8 G/DL (ref 12–15.9)
HGB UR QL STRIP.AUTO: NEGATIVE
HYALINE CASTS UR QL AUTO: NORMAL /LPF
IMM GRANULOCYTES # BLD AUTO: 0.04 10*3/MM3 (ref 0–0.05)
IMM GRANULOCYTES NFR BLD AUTO: 0.3 % (ref 0–0.5)
KETONES UR QL STRIP: ABNORMAL
LEUKOCYTE ESTERASE UR QL STRIP.AUTO: NEGATIVE
LIPASE SERPL-CCNC: 28 U/L (ref 13–60)
LYMPHOCYTES # BLD AUTO: 0.5 10*3/MM3 (ref 0.7–3.1)
LYMPHOCYTES NFR BLD AUTO: 3.9 % (ref 19.6–45.3)
MCH RBC QN AUTO: 26 PG (ref 26.6–33)
MCHC RBC AUTO-ENTMCNC: 31.3 G/DL (ref 31.5–35.7)
MCV RBC AUTO: 83 FL (ref 79–97)
METHADONE UR QL SCN: NEGATIVE
MONOCYTES # BLD AUTO: 0.18 10*3/MM3 (ref 0.1–0.9)
MONOCYTES NFR BLD AUTO: 1.4 % (ref 5–12)
NEUTROPHILS NFR BLD AUTO: 11.96 10*3/MM3 (ref 1.7–7)
NEUTROPHILS NFR BLD AUTO: 94.1 % (ref 42.7–76)
NITRITE UR QL STRIP: NEGATIVE
NRBC BLD AUTO-RTO: 0 /100 WBC (ref 0–0.2)
OPIATES UR QL: NEGATIVE
OXYCODONE UR QL SCN: NEGATIVE
PCP UR QL SCN: NEGATIVE
PH UR STRIP.AUTO: 7 [PH] (ref 5–8)
PLATELET # BLD AUTO: 349 10*3/MM3 (ref 140–450)
PMV BLD AUTO: 10.2 FL (ref 6–12)
POTASSIUM SERPL-SCNC: 3.4 MMOL/L (ref 3.5–5.2)
PROT SERPL-MCNC: 8.2 G/DL (ref 6–8.5)
PROT UR QL STRIP: ABNORMAL
RBC # BLD AUTO: 4.93 10*6/MM3 (ref 3.77–5.28)
RBC # UR STRIP: NORMAL /HPF
REF LAB TEST METHOD: NORMAL
SODIUM SERPL-SCNC: 137 MMOL/L (ref 136–145)
SP GR UR STRIP: 1.02 (ref 1–1.03)
SQUAMOUS #/AREA URNS HPF: NORMAL /HPF
TRICYCLICS UR QL SCN: NEGATIVE
UROBILINOGEN UR QL STRIP: ABNORMAL
WBC # UR STRIP: NORMAL /HPF
WBC NRBC COR # BLD AUTO: 12.71 10*3/MM3 (ref 3.4–10.8)

## 2025-02-26 PROCEDURE — 25810000003 LACTATED RINGERS SOLUTION: Performed by: STUDENT IN AN ORGANIZED HEALTH CARE EDUCATION/TRAINING PROGRAM

## 2025-02-26 PROCEDURE — 25510000001 IOPAMIDOL 61 % SOLUTION: Performed by: STUDENT IN AN ORGANIZED HEALTH CARE EDUCATION/TRAINING PROGRAM

## 2025-02-26 PROCEDURE — 83690 ASSAY OF LIPASE: CPT | Performed by: STUDENT IN AN ORGANIZED HEALTH CARE EDUCATION/TRAINING PROGRAM

## 2025-02-26 PROCEDURE — 81001 URINALYSIS AUTO W/SCOPE: CPT | Performed by: STUDENT IN AN ORGANIZED HEALTH CARE EDUCATION/TRAINING PROGRAM

## 2025-02-26 PROCEDURE — 36415 COLL VENOUS BLD VENIPUNCTURE: CPT

## 2025-02-26 PROCEDURE — 96376 TX/PRO/DX INJ SAME DRUG ADON: CPT

## 2025-02-26 PROCEDURE — 99285 EMERGENCY DEPT VISIT HI MDM: CPT

## 2025-02-26 PROCEDURE — 25010000002 ONDANSETRON PER 1 MG: Performed by: STUDENT IN AN ORGANIZED HEALTH CARE EDUCATION/TRAINING PROGRAM

## 2025-02-26 PROCEDURE — 96375 TX/PRO/DX INJ NEW DRUG ADDON: CPT

## 2025-02-26 PROCEDURE — 84703 CHORIONIC GONADOTROPIN ASSAY: CPT | Performed by: STUDENT IN AN ORGANIZED HEALTH CARE EDUCATION/TRAINING PROGRAM

## 2025-02-26 PROCEDURE — 25010000002 HALOPERIDOL LACTATE PER 5 MG: Performed by: STUDENT IN AN ORGANIZED HEALTH CARE EDUCATION/TRAINING PROGRAM

## 2025-02-26 PROCEDURE — 74177 CT ABD & PELVIS W/CONTRAST: CPT

## 2025-02-26 PROCEDURE — 85025 COMPLETE CBC W/AUTO DIFF WBC: CPT | Performed by: STUDENT IN AN ORGANIZED HEALTH CARE EDUCATION/TRAINING PROGRAM

## 2025-02-26 PROCEDURE — 83605 ASSAY OF LACTIC ACID: CPT | Performed by: STUDENT IN AN ORGANIZED HEALTH CARE EDUCATION/TRAINING PROGRAM

## 2025-02-26 PROCEDURE — 25010000002 HYDRALAZINE PER 20 MG: Performed by: STUDENT IN AN ORGANIZED HEALTH CARE EDUCATION/TRAINING PROGRAM

## 2025-02-26 PROCEDURE — 96374 THER/PROPH/DIAG INJ IV PUSH: CPT

## 2025-02-26 PROCEDURE — 25010000002 KETOROLAC TROMETHAMINE PER 15 MG: Performed by: STUDENT IN AN ORGANIZED HEALTH CARE EDUCATION/TRAINING PROGRAM

## 2025-02-26 PROCEDURE — 80053 COMPREHEN METABOLIC PANEL: CPT | Performed by: STUDENT IN AN ORGANIZED HEALTH CARE EDUCATION/TRAINING PROGRAM

## 2025-02-26 PROCEDURE — 96372 THER/PROPH/DIAG INJ SC/IM: CPT

## 2025-02-26 PROCEDURE — 80307 DRUG TEST PRSMV CHEM ANLYZR: CPT | Performed by: STUDENT IN AN ORGANIZED HEALTH CARE EDUCATION/TRAINING PROGRAM

## 2025-02-26 PROCEDURE — 25010000002 MORPHINE PER 10 MG: Performed by: STUDENT IN AN ORGANIZED HEALTH CARE EDUCATION/TRAINING PROGRAM

## 2025-02-26 RX ORDER — HYDRALAZINE HYDROCHLORIDE 20 MG/ML
10 INJECTION INTRAMUSCULAR; INTRAVENOUS ONCE
Status: COMPLETED | OUTPATIENT
Start: 2025-02-26 | End: 2025-02-26

## 2025-02-26 RX ORDER — SODIUM CHLORIDE 0.9 % (FLUSH) 0.9 %
10 SYRINGE (ML) INJECTION AS NEEDED
Status: DISCONTINUED | OUTPATIENT
Start: 2025-02-26 | End: 2025-02-27 | Stop reason: HOSPADM

## 2025-02-26 RX ORDER — HALOPERIDOL 5 MG/ML
1 INJECTION INTRAMUSCULAR ONCE AS NEEDED
Status: COMPLETED | OUTPATIENT
Start: 2025-02-26 | End: 2025-02-26

## 2025-02-26 RX ORDER — KETOROLAC TROMETHAMINE 15 MG/ML
15 INJECTION, SOLUTION INTRAMUSCULAR; INTRAVENOUS ONCE
Status: COMPLETED | OUTPATIENT
Start: 2025-02-26 | End: 2025-02-26

## 2025-02-26 RX ORDER — IOPAMIDOL 612 MG/ML
100 INJECTION, SOLUTION INTRAVASCULAR
Status: COMPLETED | OUTPATIENT
Start: 2025-02-26 | End: 2025-02-26

## 2025-02-26 RX ORDER — ONDANSETRON 2 MG/ML
4 INJECTION INTRAMUSCULAR; INTRAVENOUS ONCE
Status: COMPLETED | OUTPATIENT
Start: 2025-02-26 | End: 2025-02-26

## 2025-02-26 RX ADMIN — HALOPERIDOL LACTATE 1 MG: 5 INJECTION, SOLUTION INTRAMUSCULAR at 22:04

## 2025-02-26 RX ADMIN — IOPAMIDOL 100 ML: 612 INJECTION, SOLUTION INTRAVENOUS at 21:44

## 2025-02-26 RX ADMIN — MORPHINE SULFATE 4 MG: 4 INJECTION, SOLUTION INTRAMUSCULAR; INTRAVENOUS at 20:55

## 2025-02-26 RX ADMIN — SODIUM CHLORIDE, SODIUM LACTATE, POTASSIUM CHLORIDE, CALCIUM CHLORIDE 1000 ML: 20; 30; 600; 310 INJECTION, SOLUTION INTRAVENOUS at 20:55

## 2025-02-26 RX ADMIN — ONDANSETRON 4 MG: 2 INJECTION INTRAMUSCULAR; INTRAVENOUS at 20:55

## 2025-02-26 RX ADMIN — KETOROLAC TROMETHAMINE 15 MG: 15 INJECTION, SOLUTION INTRAMUSCULAR; INTRAVENOUS at 23:36

## 2025-02-26 RX ADMIN — SODIUM CHLORIDE, SODIUM LACTATE, POTASSIUM CHLORIDE, CALCIUM CHLORIDE 1000 ML: 20; 30; 600; 310 INJECTION, SOLUTION INTRAVENOUS at 21:59

## 2025-02-26 RX ADMIN — HYDRALAZINE HYDROCHLORIDE 10 MG: 20 INJECTION INTRAMUSCULAR; INTRAVENOUS at 22:04

## 2025-02-26 RX ADMIN — HYDRALAZINE HYDROCHLORIDE 10 MG: 20 INJECTION INTRAMUSCULAR; INTRAVENOUS at 23:05

## 2025-02-27 VITALS
SYSTOLIC BLOOD PRESSURE: 154 MMHG | HEIGHT: 63 IN | TEMPERATURE: 98.2 F | WEIGHT: 165 LBS | RESPIRATION RATE: 19 BRPM | DIASTOLIC BLOOD PRESSURE: 88 MMHG | HEART RATE: 98 BPM | OXYGEN SATURATION: 99 % | BODY MASS INDEX: 29.23 KG/M2

## 2025-02-27 RX ORDER — AMLODIPINE BESYLATE 5 MG/1
5 TABLET ORAL DAILY
Qty: 30 TABLET | Refills: 0 | Status: SHIPPED | OUTPATIENT
Start: 2025-02-27 | End: 2025-03-29

## 2025-02-27 RX ORDER — DICYCLOMINE HCL 20 MG
20 TABLET ORAL EVERY 6 HOURS PRN
Qty: 28 TABLET | Refills: 0 | Status: SHIPPED | OUTPATIENT
Start: 2025-02-27 | End: 2025-03-06

## 2025-02-27 RX ORDER — ONDANSETRON 4 MG/1
4 TABLET, ORALLY DISINTEGRATING ORAL EVERY 6 HOURS PRN
Qty: 12 TABLET | Refills: 0 | Status: SHIPPED | OUTPATIENT
Start: 2025-02-27 | End: 2025-03-02

## 2025-02-27 NOTE — ED PROVIDER NOTES
"EMERGENCY DEPARTMENT ATTENDING NOTE    Patient Name: Selin Meyer    Chief Complaint   Patient presents with    Abdominal Pain       PATIENT PRESENTATION:  Selin Meyer is a very pleasant 39 y.o. female with history of prior C-sections but otherwise no significant past medical presenting to the emergency department due to acute abdominal pain with nausea and vomiting.    patient states that starting at about 1500 she had acute onset nausea and vomiting in which she describes severe generalized abdominal pain.  She had a prior  but no other abdominal surgeries.  Denies any diarrhea.  No history of bowel obstructions.  Denies fevers or chills.  States he is currently on menstrual period.  Denies any vaginal discharge.      PHYSICAL EXAM:   VS: /88 (BP Location: Right arm, Patient Position: Sitting)   Pulse 98   Temp 98.2 °F (36.8 °C) (Oral)   Resp 19   Ht 160 cm (63\")   Wt 74.8 kg (165 lb)   LMP 2025   SpO2 99%   BMI 29.23 kg/m²   GENERAL: Uncomfortable appearing woman sitting up in stretcher; well-nourished, well-developed, awake, alert, no acute distress, nontoxic appearing  EYES: PERRL, sclerae anicteric, extraocular movements grossly intact, symmetric lids  EARS, NOSE, MOUTH, THROAT: atraumatic external nose and ears, moist mucous membranes  NECK: symmetric, trachea midline  RESPIRATORY: unlabored respiratory effort, clear to auscultation bilaterally, good air movement  CARDIOVASCULAR: no murmurs, peripheral pulses 2+ and equal in all extremities  GI: soft, mild generalized abdominal tenderness, no peritoneal signs, nondistended  MUSCULOSKELETAL/EXTREMITIES: extremities without obvious deformity  SKIN: warm and dry with no obvious rashes  NEUROLOGIC: moving all 4 extremities symmetrically, awake and alert  PSYCHIATRIC: alert, pleasant and cooperative. Appropriate mood and affect.      MEDICAL DECISION MAKING:    Selin Meyer is a 39 y.o. female who presented to the ED " due to acute severe abdominal pain.  Patient fairly profoundly hypertensive on arrival signs and she has a chronic hypertension with no wrist taken medications at also consider substance use such as methamphetamine or cocaine although patient initially denied.  Pursue quite broad workup given her significant pain initiated IV fluids Zofran morphine and then later added Haldol with significant improvement.  Negative pregnancy test so, ruptured ectopic pregnancy added Toradol.  Slightly cytosis neutrophilia so pursued CT imaging.  Ultimately there is no acute pathology appropriate steroid reviewed.  Nurse lactate 3.3 with repeat 2.3 which this is not consistent sepsis this suspect this is due to dehydration.  Urine drug screen is positive for methamphetamine endophyte amine which were broadly contributor elevated blood pressure on top of her chronic hypertension reports she is not taking her medications.  Patient tolerating p.o. prior to discharge vital signs essentially normalized she had been given some hydralazine for blood pressure management.  Patient was counseled on importance of managing her long-term blood pressure initiating low-dose amlodipine given she is not on any active scripts and has a known history of hypertension.  Counseled on the importance of abstaining from any illicit drug use prickly disc and potentially contributory symptoms.  Significant tenderness in urine which is also consistent dehydration.  Patient is referred to plan to follow-up Department of Medicine as well as was not patient with return precautions to the Emergency Department for any worsening symptoms.    Differential Diagnosis Considered: Intra-abdominal infection such as acute appendicitis, diverticulitis, chronic hypertension, hypertensive urgency, aortic aneurysm, substance abuse, urinary tract infection, ectopic pregnancy    Labs Ordered:  Labs Reviewed   COMPREHENSIVE METABOLIC PANEL - Abnormal; Notable for the following  components:       Result Value    Glucose 137 (*)     Creatinine 0.56 (*)     Potassium 3.4 (*)     Chloride 97 (*)     CO2 21.0 (*)     Alkaline Phosphatase 119 (*)     Anion Gap 19.0 (*)     All other components within normal limits    Narrative:     GFR Categories in Chronic Kidney Disease (CKD)      GFR Category          GFR (mL/min/1.73)    Interpretation  G1                     90 or greater         Normal or high (1)  G2                      60-89                Mild decrease (1)  G3a                   45-59                Mild to moderate decrease  G3b                   30-44                Moderate to severe decrease  G4                    15-29                Severe decrease  G5                    14 or less           Kidney failure          (1)In the absence of evidence of kidney disease, neither GFR category G1 or G2 fulfill the criteria for CKD.    eGFR calculation 2021 CKD-EPI creatinine equation, which does not include race as a factor   LACTIC ACID, PLASMA - Abnormal; Notable for the following components:    Lactate 3.3 (*)     All other components within normal limits   URINALYSIS W/ CULTURE IF INDICATED - Abnormal; Notable for the following components:    Glucose,  mg/dL (Trace) (*)     Ketones, UA 80 mg/dL (3+) (*)     Protein,  mg/dL (2+) (*)     All other components within normal limits    Narrative:     In absence of clinical symptoms, the presence of pyuria, bacteria, and/or nitrites on the urinalysis result does not correlate with infection.   CBC WITH AUTO DIFFERENTIAL - Abnormal; Notable for the following components:    WBC 12.71 (*)     MCH 26.0 (*)     MCHC 31.3 (*)     RDW 16.1 (*)     Neutrophil % 94.1 (*)     Lymphocyte % 3.9 (*)     Monocyte % 1.4 (*)     Eosinophil % 0.1 (*)     Neutrophils, Absolute 11.96 (*)     Lymphocytes, Absolute 0.50 (*)     All other components within normal limits   URINE DRUG SCREEN - Abnormal; Notable for the following components:    THC,  Screen, Urine Positive (*)     Methamphetamine, Ur Positive (*)     Amphetamine Screen, Urine Positive (*)     All other components within normal limits    Narrative:     Cutoff For Drugs Screened:    Amphetamines               500 ng/ml  Barbiturates               200 ng/ml  Benzodiazepines            150 ng/ml  Cocaine                    150 ng/ml  Methadone                  200 ng/ml  Opiates                    100 ng/ml  Phencyclidine               25 ng/ml  THC                         50 ng/ml  Methamphetamine            500 ng/ml  Tricyclic Antidepressants  300 ng/ml  Oxycodone                  100 ng/ml  Buprenorphine               10 ng/ml    The normal value for all drugs tested is negative. This report includes unconfirmed screening results, with the cutoff values listed, to be used for medical treatment purposes only.  Unconfirmed results must not be used for non-medical purposes such as employment or legal testing.  Clinical consideration should be applied to any drug of abuse test, particularly when unconfirmed results are used.     LACTIC ACID, PLASMA - Abnormal; Notable for the following components:    Lactate 2.3 (*)     All other components within normal limits   LIPASE - Normal   HCG, SERUM, QUALITATIVE - Normal   FENTANYL, URINE - Normal    Narrative:     Negative Threshold:      Fentanyl 5 ng/mL     The normal value for the drug tested is negative. This report includes final unconfirmed screening results to be used for medical treatment purposes only. Unconfirmed results must not be used for non-medical purposes such as employment or legal testing. Clinical consideration should be applied to any drug of abuse test, particularly when unconfirmed results are used.          URINALYSIS, MICROSCOPIC ONLY   CBC AND DIFFERENTIAL    Narrative:     The following orders were created for panel order CBC & Differential.  Procedure                               Abnormality         Status                      ---------                               -----------         ------                     CBC Auto Differential[[id number]]        Abnormal            Final result                 Please view results for these tests on the individual orders.        Imaging Ordered:   CT Abdomen Pelvis With Contrast   Final Result   1. No acute abnormality identified.   2. Motion degraded exam.           This report was signed and finalized on 2025 9:54 PM by Esvin Gamez.              Internal chart review:   Past Medical History:   Diagnosis Date    Asthma     childhood    Depression     Hypertension     Placenta abruptio        Past Surgical History:   Procedure Laterality Date     SECTION      CYST REMOVAL      underarm    TONSILLECTOMY         Allergies   Allergen Reactions    Bee Venom        No current facility-administered medications for this encounter.    Current Outpatient Medications:     amLODIPine (NORVASC) 5 MG tablet, Take 1 tablet by mouth Daily for 30 days., Disp: 30 tablet, Rfl: 0    dicyclomine (BENTYL) 20 MG tablet, Take 1 tablet by mouth Every 6 (Six) Hours As Needed for Abdominal Cramping for up to 7 days., Disp: 28 tablet, Rfl: 0    ondansetron ODT (ZOFRAN-ODT) 4 MG disintegrating tablet, Place 1 tablet on the tongue Every 6 (Six) Hours As Needed for Nausea or Vomiting for up to 3 days., Disp: 12 tablet, Rfl: 0    My lab interpretation: Initial lactic 3.3 with repeat tumor 3.  Urinalysis with significant ketones consistent dehydration but no evidence of infection.  Urine screen positive for THC methamphetamine and amphetamine.  Elevated white count of 12.71 with some neutrophilic shift.  CMP with normal creatinine no liver enzyme elevation.  Normal lipase.  Negative (yes.    My imaging interpretation: CT abdomen pelvis with no acute findings.      ED Diagnosis:  (R10.84) Acute generalized abdominal pain    (R11.2) Nausea and vomiting, unspecified vomiting type    (Z98.891) History of   section    (I10) Chronic hypertension    (Z91.199) Medically noncompliant    (F15.10) Methamphetamine abuse    (F12.90) Marijuana use     Disposition: to home  Follow up plan: PCP follow up within 2 days, return to ED immediately if symptoms worsen        Signed:  Kyle Euceda MD  Emergency Medicine Physician    Please note that portions of this note were completed with a voice recognition program.      Kyle Euceda MD  02/27/25 0828

## 2025-02-27 NOTE — DISCHARGE INSTRUCTIONS
Today you are seen for your symptoms and all of your work was fairly reassuring.  Your blood pressure was quite elevated Emergency Department you did test positive in your urine for methamphetamine which can contribute elevated blood pressures also may have been diagnosed with high blood pressure such importantly take her blood pressure medications.  Please see her primary care provider soon as possible and outpatient discussed your blood pressure regimen as you clearly medications long-term high blood pressure can contribute things like stroke I have prescribed you a low-dose amlodipine in the meantime to take daily to start treatment we will likely need increases in outpatient..  I prescribed some Bentyl for any abdominal cramping and can also take Advil.  If any of your symptoms worsen prior please return to the emergency department immediately for re-evaluation.

## 2025-04-20 ENCOUNTER — HOSPITAL ENCOUNTER (EMERGENCY)
Age: 40
Discharge: HOME | End: 2025-04-20
Attending: STUDENT IN AN ORGANIZED HEALTH CARE EDUCATION/TRAINING PROGRAM
Payer: COMMERCIAL

## 2025-04-20 VITALS
HEIGHT: 63 IN | TEMPERATURE: 98.2 F | RESPIRATION RATE: 24 BRPM | BODY MASS INDEX: 23.04 KG/M2 | OXYGEN SATURATION: 99 % | WEIGHT: 130 LBS | DIASTOLIC BLOOD PRESSURE: 114 MMHG | SYSTOLIC BLOOD PRESSURE: 169 MMHG | HEART RATE: 75 BPM

## 2025-04-20 DIAGNOSIS — S01.81XA CHIN LACERATION, INITIAL ENCOUNTER: ICD-10-CM
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
## 2025-04-20 LAB
HAV IGM SERPL QL IA: NONREACTIVE
HBV CORE IGM SERPL QL IA: NONREACTIVE
HBV SURFACE AG SERPL QL IA: NORMAL
HCV AB SERPL QL IA: NORMAL
HIV-1 P24 AG: NORMAL
HIV1+2 AB SERPLBLD QL IA.RAPID: NORMAL

## 2025-04-20 PROCEDURE — 80074 ACUTE HEPATITIS PANEL: CPT

## 2025-04-20 PROCEDURE — 87806 HIV AG W/HIV1&2 ANTB W/OPTIC: CPT

## 2025-04-20 PROCEDURE — 99284 EMERGENCY DEPT VISIT MOD MDM: CPT

## 2025-04-20 PROCEDURE — 90471 IMMUNIZATION ADMIN: CPT | Performed by: STUDENT IN AN ORGANIZED HEALTH CARE EDUCATION/TRAINING PROGRAM

## 2025-04-20 PROCEDURE — 36415 COLL VENOUS BLD VENIPUNCTURE: CPT

## 2025-04-20 PROCEDURE — 12011 RPR F/E/E/N/L/M 2.5 CM/<: CPT

## 2025-04-20 PROCEDURE — 6360000002 HC RX W HCPCS: Performed by: STUDENT IN AN ORGANIZED HEALTH CARE EDUCATION/TRAINING PROGRAM

## 2025-04-20 PROCEDURE — 90715 TDAP VACCINE 7 YRS/> IM: CPT | Performed by: STUDENT IN AN ORGANIZED HEALTH CARE EDUCATION/TRAINING PROGRAM

## 2025-04-20 RX ORDER — LIDOCAINE HYDROCHLORIDE AND EPINEPHRINE 10; 10 MG/ML; UG/ML
20 INJECTION, SOLUTION INFILTRATION; PERINEURAL ONCE
Status: DISCONTINUED | OUTPATIENT
Start: 2025-04-20 | End: 2025-04-20 | Stop reason: HOSPADM

## 2025-04-20 RX ADMIN — TETANUS TOXOID, REDUCED DIPHTHERIA TOXOID AND ACELLULAR PERTUSSIS VACCINE, ADSORBED 0.5 ML: 5; 2.5; 8; 8; 2.5 SUSPENSION INTRAMUSCULAR at 04:03

## 2025-04-20 ASSESSMENT — PAIN DESCRIPTION - LOCATION: LOCATION: FACE

## 2025-04-20 ASSESSMENT — PAIN - FUNCTIONAL ASSESSMENT: PAIN_FUNCTIONAL_ASSESSMENT: 0-10

## 2025-04-20 ASSESSMENT — PAIN SCALES - GENERAL: PAINLEVEL_OUTOF10: 6

## 2025-04-20 NOTE — DISCHARGE INSTRUCTIONS
Concerning your sutured laceration:    Keep the wound clean, dry, and free of debris. Keep the wound covered if you will be doing activities that could cause the wound to become dirty.    If you are using a bandage, please change the bandage and inspect the wound at least once a day for signs of infection--redness, worsening pain, high fevers, or yellow or foul-smelling drainage. Return to the ER if any of these symptoms develop.    Also, remember, you may have a retained foreign body in your wound. If you have persistent pain, redness, or swelling, even after skin has healed, please follow-up or return to the emergency department to check for retained foreign body.    You can shower while the stitches are in place, but do not scrub the wound and do not submerge it under water (take a shower, not a bath).    Have sutures removed in 5 days, either at your doctor's office, at prompt care, or return to the ER.    Follow-up with your doctor as discussed.    The examination and treatment you have received in the Emergency Department has been given on an emergency basis only.    This limited encounter is not a replacement for the comprehensive services provided by a primary care physician. We recommend follow up for further preventative and ongoing paulo screening, especially if your symptoms persists, worsen, or change in quality or character. When calling for a follow up appointment, please remember to inform the office that you were seen in the Emergency Department and that you are requesting a follow up visit.    Again, it is very important that you return immediately if your condition worsens, any new symptoms develop, or you do not recover as expected.
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
